# Patient Record
Sex: FEMALE | Race: WHITE | NOT HISPANIC OR LATINO | Employment: FULL TIME | URBAN - METROPOLITAN AREA
[De-identification: names, ages, dates, MRNs, and addresses within clinical notes are randomized per-mention and may not be internally consistent; named-entity substitution may affect disease eponyms.]

---

## 2020-01-13 ENCOUNTER — OFFICE VISIT (OUTPATIENT)
Dept: FAMILY MEDICINE CLINIC | Facility: CLINIC | Age: 25
End: 2020-01-13

## 2020-01-13 VITALS
HEIGHT: 68 IN | SYSTOLIC BLOOD PRESSURE: 124 MMHG | HEART RATE: 85 BPM | TEMPERATURE: 97.9 F | RESPIRATION RATE: 16 BRPM | BODY MASS INDEX: 25.76 KG/M2 | WEIGHT: 170 LBS | DIASTOLIC BLOOD PRESSURE: 74 MMHG | OXYGEN SATURATION: 99 %

## 2020-01-13 DIAGNOSIS — R10.11 RIGHT UPPER QUADRANT ABDOMINAL PAIN: Primary | ICD-10-CM

## 2020-01-13 DIAGNOSIS — R11.2 NAUSEA AND VOMITING, INTRACTABILITY OF VOMITING NOT SPECIFIED, UNSPECIFIED VOMITING TYPE: ICD-10-CM

## 2020-01-13 DIAGNOSIS — E87.6 HYPOKALEMIA: ICD-10-CM

## 2020-01-13 PROCEDURE — 99203 OFFICE O/P NEW LOW 30 MIN: CPT | Performed by: NURSE PRACTITIONER

## 2020-01-13 RX ORDER — ONDANSETRON 4 MG/1
4 TABLET, FILM COATED ORAL EVERY 8 HOURS PRN
COMMUNITY
End: 2020-07-22

## 2020-01-13 NOTE — PROGRESS NOTES
Subjective   Yoly Reed is a 24 y.o. female.       History of Present Illness Here to establish care. PCP is Sophia Marshall MD in [place] who is out for the flu.  Patient was seen in [place] ER last night for nausea, vomiting and abd apin.  3-4 days of abd pain. Last night was awoken from her sleep with severe RUQ abd pain with nausea and vomiting. Pain radiated to upper abd. Pain was 10/10. Currently 4/10. Vomited 12-13 times. No diarrhea. She ate sausage biscuit, cajun chicken pasta yesterday.  3 years ago had HIDA scan for nausea after high fat meals. HIDA was 47%. US was normal.  She took Zofran at the hospital. She ate a pop-tart at 7:15 this am. Passing gas  Some nausea.  4 months post partum.    Outpatient Encounter Medications as of 1/13/2020   Medication Sig Dispense Refill   • ondansetron (ZOFRAN) 4 MG tablet Take 4 mg by mouth Every 8 (Eight) Hours As Needed for Nausea or Vomiting.     • SPRINTEC 28 0.25-35 MG-MCG per tablet Take 1 tablet by mouth Daily. as directed       No facility-administered encounter medications on file as of 1/13/2020.        The following portions of the patient's history were reviewed and updated as appropriate: allergies, current medications, past family history, past medical history, past social history, past surgical history and problem list.    Review of Systems   Constitutional: Negative for appetite change, fever, unexpected weight gain and unexpected weight loss.   HENT: Negative for congestion, nosebleeds, sore throat and trouble swallowing.    Eyes: Negative for visual disturbance.   Respiratory: Negative for cough, shortness of breath and wheezing.    Cardiovascular: Negative for chest pain, palpitations and leg swelling.   Gastrointestinal: Positive for abdominal pain, nausea and vomiting. Negative for blood in stool, constipation and diarrhea.   Endocrine: Negative for polydipsia, polyphagia and polyuria.   Genitourinary: Negative for dysuria, frequency and  "hematuria.   Musculoskeletal: Negative for arthralgias, joint swelling and myalgias.   Skin: Negative for rash.   Neurological: Negative for dizziness, seizures, syncope and numbness.   Hematological: Negative for adenopathy. Does not bruise/bleed easily.   Psychiatric/Behavioral: Negative for behavioral problems, sleep disturbance and depressed mood. The patient is not nervous/anxious.        Objective     Visit Vitals  /74   Pulse 85   Temp 97.9 °F (36.6 °C)   Resp 16   Ht 172.7 cm (68\")   Wt 77.1 kg (170 lb)   LMP 01/07/2020   SpO2 99%   BMI 25.85 kg/m²       Physical Exam   Constitutional: She is oriented to person, place, and time. She appears well-developed and well-nourished. No distress.   HENT:   Head: Normocephalic and atraumatic.   Right Ear: Tympanic membrane and external ear normal.   Left Ear: Tympanic membrane and external ear normal.   Nose: Nose normal.   Mouth/Throat: Oropharynx is clear and moist. No oropharyngeal exudate.   Eyes: Pupils are equal, round, and reactive to light. Conjunctivae are normal. Right eye exhibits no discharge. Left eye exhibits no discharge. No scleral icterus.   Neck: Neck supple. No tracheal deviation present. No thyromegaly present.   Cardiovascular: Normal rate, regular rhythm and normal heart sounds. Exam reveals no gallop and no friction rub.   No murmur heard.  Pulmonary/Chest: Effort normal and breath sounds normal. No respiratory distress. She has no wheezes.   Abdominal: Soft. She exhibits no distension and no mass. Bowel sounds are decreased. There is tenderness in the right upper quadrant, epigastric area and left upper quadrant.   Musculoskeletal: She exhibits no edema or deformity.   Lymphadenopathy:     She has no cervical adenopathy.   Neurological: She is alert and oriented to person, place, and time. Coordination normal.   Skin: Skin is warm and dry. Capillary refill takes less than 2 seconds. No rash noted. No erythema.   Psychiatric: She has a " normal mood and affect. Her speech is normal and behavior is normal. Judgment and thought content normal.   Nursing note and vitals reviewed.        Assessment/Plan   Yoly was seen today for vomiting and abdominal pain.    Diagnoses and all orders for this visit:    Right upper quadrant abdominal pain  -     US Abdomen Complete; Future  -     Ambulatory Referral to General Surgery    Nausea and vomiting, intractability of vomiting not specified, unspecified vomiting type  -     US Abdomen Complete; Future  -     Ambulatory Referral to General Surgery    Hypokalemia      ER notes reviewed. CT showed diffuse gallbladder wall thickening and hypedense punctate lesion. US recommended  Potassium 3.1.  Drink gatorade today. Low fat diet.  Use Zofran prn.  Off work today.  Follow up after visit with surgeon.   Return to ER for worsening symptoms.

## 2020-01-16 ENCOUNTER — HOSPITAL ENCOUNTER (OUTPATIENT)
Dept: ULTRASOUND IMAGING | Facility: HOSPITAL | Age: 25
End: 2020-01-16

## 2020-03-02 ENCOUNTER — OFFICE VISIT (OUTPATIENT)
Dept: FAMILY MEDICINE CLINIC | Facility: CLINIC | Age: 25
End: 2020-03-02

## 2020-03-02 VITALS
TEMPERATURE: 98 F | BODY MASS INDEX: 25.57 KG/M2 | HEART RATE: 77 BPM | HEIGHT: 68 IN | WEIGHT: 168.7 LBS | RESPIRATION RATE: 19 BRPM | OXYGEN SATURATION: 100 %

## 2020-03-02 DIAGNOSIS — R11.2 NAUSEA AND VOMITING, INTRACTABILITY OF VOMITING NOT SPECIFIED, UNSPECIFIED VOMITING TYPE: ICD-10-CM

## 2020-03-02 DIAGNOSIS — R10.12 LEFT UPPER QUADRANT PAIN: ICD-10-CM

## 2020-03-02 DIAGNOSIS — R42 DIZZINESS: ICD-10-CM

## 2020-03-02 DIAGNOSIS — R19.7 DIARRHEA, UNSPECIFIED TYPE: Primary | ICD-10-CM

## 2020-03-02 LAB
ALBUMIN SERPL-MCNC: 4.8 G/DL (ref 3.5–5.2)
ALBUMIN/GLOB SERPL: 1.9 G/DL
ALP SERPL-CCNC: 76 U/L (ref 39–117)
ALT SERPL W P-5'-P-CCNC: 23 U/L (ref 1–33)
AMYLASE SERPL-CCNC: 28 U/L (ref 28–100)
ANION GAP SERPL CALCULATED.3IONS-SCNC: 18.3 MMOL/L (ref 5–15)
AST SERPL-CCNC: 24 U/L (ref 1–32)
BASOPHILS # BLD AUTO: 0.04 10*3/MM3 (ref 0–0.2)
BASOPHILS NFR BLD AUTO: 0.6 % (ref 0–1.5)
BILIRUB SERPL-MCNC: 0.2 MG/DL (ref 0.2–1.2)
BUN BLD-MCNC: 9 MG/DL (ref 6–20)
BUN/CREAT SERPL: 9.4 (ref 7–25)
CALCIUM SPEC-SCNC: 9.3 MG/DL (ref 8.6–10.5)
CHLORIDE SERPL-SCNC: 100 MMOL/L (ref 98–107)
CO2 SERPL-SCNC: 21.7 MMOL/L (ref 22–29)
CREAT BLD-MCNC: 0.96 MG/DL (ref 0.57–1)
DEPRECATED RDW RBC AUTO: 40.3 FL (ref 37–54)
EOSINOPHIL # BLD AUTO: 0.06 10*3/MM3 (ref 0–0.4)
EOSINOPHIL NFR BLD AUTO: 1 % (ref 0.3–6.2)
ERYTHROCYTE [DISTWIDTH] IN BLOOD BY AUTOMATED COUNT: 13.5 % (ref 12.3–15.4)
GFR SERPL CREATININE-BSD FRML MDRD: 72 ML/MIN/1.73
GLOBULIN UR ELPH-MCNC: 2.5 GM/DL
GLUCOSE BLD-MCNC: 85 MG/DL (ref 65–99)
HAV IGM SERPL QL IA: NORMAL
HCT VFR BLD AUTO: 37.9 % (ref 34–46.6)
HGB BLD-MCNC: 12.4 G/DL (ref 12–15.9)
IMM GRANULOCYTES # BLD AUTO: 0.02 10*3/MM3 (ref 0–0.05)
IMM GRANULOCYTES NFR BLD AUTO: 0.3 % (ref 0–0.5)
LIPASE SERPL-CCNC: 18 U/L (ref 13–60)
LYMPHOCYTES # BLD AUTO: 1.96 10*3/MM3 (ref 0.7–3.1)
LYMPHOCYTES NFR BLD AUTO: 31.4 % (ref 19.6–45.3)
MCH RBC QN AUTO: 27.3 PG (ref 26.6–33)
MCHC RBC AUTO-ENTMCNC: 32.7 G/DL (ref 31.5–35.7)
MCV RBC AUTO: 83.3 FL (ref 79–97)
MONOCYTES # BLD AUTO: 0.39 10*3/MM3 (ref 0.1–0.9)
MONOCYTES NFR BLD AUTO: 6.3 % (ref 5–12)
NEUTROPHILS # BLD AUTO: 3.77 10*3/MM3 (ref 1.7–7)
NEUTROPHILS NFR BLD AUTO: 60.4 % (ref 42.7–76)
NRBC BLD AUTO-RTO: 0 /100 WBC (ref 0–0.2)
PLATELET # BLD AUTO: 353 10*3/MM3 (ref 140–450)
PMV BLD AUTO: 11.6 FL (ref 6–12)
POTASSIUM BLD-SCNC: 4.2 MMOL/L (ref 3.5–5.2)
PROT SERPL-MCNC: 7.3 G/DL (ref 6–8.5)
RBC # BLD AUTO: 4.55 10*6/MM3 (ref 3.77–5.28)
SODIUM BLD-SCNC: 140 MMOL/L (ref 136–145)
T4 FREE SERPL-MCNC: 1.19 NG/DL (ref 0.93–1.7)
TSH SERPL DL<=0.05 MIU/L-ACNC: 0.56 UIU/ML (ref 0.27–4.2)
VIT B12 BLD-MCNC: 326 PG/ML (ref 211–946)
WBC NRBC COR # BLD: 6.24 10*3/MM3 (ref 3.4–10.8)

## 2020-03-02 PROCEDURE — 85025 COMPLETE CBC W/AUTO DIFF WBC: CPT | Performed by: NURSE PRACTITIONER

## 2020-03-02 PROCEDURE — 83516 IMMUNOASSAY NONANTIBODY: CPT | Performed by: NURSE PRACTITIONER

## 2020-03-02 PROCEDURE — 82784 ASSAY IGA/IGD/IGG/IGM EACH: CPT | Performed by: NURSE PRACTITIONER

## 2020-03-02 PROCEDURE — 82607 VITAMIN B-12: CPT | Performed by: NURSE PRACTITIONER

## 2020-03-02 PROCEDURE — 83690 ASSAY OF LIPASE: CPT | Performed by: NURSE PRACTITIONER

## 2020-03-02 PROCEDURE — 86709 HEPATITIS A IGM ANTIBODY: CPT | Performed by: NURSE PRACTITIONER

## 2020-03-02 PROCEDURE — 82150 ASSAY OF AMYLASE: CPT | Performed by: NURSE PRACTITIONER

## 2020-03-02 PROCEDURE — 80053 COMPREHEN METABOLIC PANEL: CPT | Performed by: NURSE PRACTITIONER

## 2020-03-02 PROCEDURE — 99214 OFFICE O/P EST MOD 30 MIN: CPT | Performed by: NURSE PRACTITIONER

## 2020-03-02 PROCEDURE — 84443 ASSAY THYROID STIM HORMONE: CPT | Performed by: NURSE PRACTITIONER

## 2020-03-02 PROCEDURE — 86255 FLUORESCENT ANTIBODY SCREEN: CPT | Performed by: NURSE PRACTITIONER

## 2020-03-02 PROCEDURE — 84439 ASSAY OF FREE THYROXINE: CPT | Performed by: NURSE PRACTITIONER

## 2020-03-02 RX ORDER — ONDANSETRON 8 MG/1
8 TABLET, ORALLY DISINTEGRATING ORAL EVERY 8 HOURS PRN
Qty: 30 TABLET | Refills: 1 | Status: SHIPPED | OUTPATIENT
Start: 2020-03-02 | End: 2020-07-22

## 2020-03-02 NOTE — PROGRESS NOTES
Subjective   Yoly Cam is a 24 y.o. female.     History of Present Illness Complains of episodes of alternating constipation and diarrhea for years. Justin on 1/22/20. Since then she has had frequent nausea, vomiting, diarrhea, dizziness and LLQ pain radiating to umbilicus. LMP 2/11/20. Cycles are reg. Not taking birth control. Last birth control 3 weeks ago. She has taking zofran and ibuprofen.  Will have diarrhea 5-6 times a day. No melena or hematochezia.  Last CT was prior to justin. No Gi? findings except the gall stones.  Mom and sister with same symptoms that are chronic and not diagnosed.      Outpatient Encounter Medications as of 3/2/2020   Medication Sig Dispense Refill   • ondansetron (ZOFRAN) 4 MG tablet Take 4 mg by mouth Every 8 (Eight) Hours As Needed for Nausea or Vomiting.     • SPRINTEC 28 0.25-35 MG-MCG per tablet Take 1 tablet by mouth Daily. as directed       No facility-administered encounter medications on file as of 3/2/2020.        The following portions of the patient's history were reviewed and updated as appropriate: allergies, current medications, past family history, past medical history, past social history, past surgical history and problem list.    Review of Systems   Constitutional: Positive for fatigue. Negative for appetite change, fever, unexpected weight gain and unexpected weight loss.   HENT: Negative for congestion, nosebleeds, sore throat and trouble swallowing.    Eyes: Negative for visual disturbance.   Respiratory: Negative for cough, shortness of breath and wheezing.    Cardiovascular: Negative for chest pain, palpitations and leg swelling.   Gastrointestinal: Positive for abdominal pain, diarrhea, nausea and vomiting. Negative for blood in stool and constipation.   Endocrine: Negative for polydipsia, polyphagia and polyuria.   Genitourinary: Negative for dysuria, frequency and hematuria.   Musculoskeletal: Negative for arthralgias, joint swelling and  "myalgias.   Skin: Negative for rash.   Neurological: Positive for dizziness and headache. Negative for seizures, syncope and numbness.   Hematological: Negative for adenopathy. Does not bruise/bleed easily.   Psychiatric/Behavioral: Negative for behavioral problems, sleep disturbance and depressed mood. The patient is not nervous/anxious.        Objective     Visit Vitals  Pulse 77   Temp 98 °F (36.7 °C)   Resp 19   Ht 172.7 cm (68\")   Wt 76.5 kg (168 lb 11.2 oz)   SpO2 100%   BMI 25.65 kg/m²       Physical Exam   Constitutional: She is oriented to person, place, and time. She appears well-developed and well-nourished. No distress.   HENT:   Head: Normocephalic and atraumatic.   Right Ear: External ear normal.   Left Ear: External ear normal.   Nose: Nose normal.   Eyes: Conjunctivae are normal. Right eye exhibits no discharge. Left eye exhibits no discharge. No scleral icterus.   Neck: Normal range of motion.   Cardiovascular: Normal rate.   Pulmonary/Chest: Effort normal. No respiratory distress.   Abdominal: Soft. Bowel sounds are normal. She exhibits no distension and no mass. There is tenderness. There is no rebound and no guarding. No hernia.   Pain in LUQ. Well healed surgical scars.   Musculoskeletal: She exhibits no deformity.   Neurological: She is alert and oriented to person, place, and time. Coordination normal.   Skin: Skin is warm and dry.   Psychiatric: She has a normal mood and affect. Her behavior is normal. Judgment and thought content normal.   Vitals reviewed.        Assessment/Plan   Yoly was seen today for nausea, vomiting and diarrhea.    Diagnoses and all orders for this visit:    Diarrhea, unspecified type  -     Clostridium Difficile Toxin - Stool, Per Rectum; Future  -     Ova & Parasite Examination - Stool, Per Rectum; Future  -     Stool Culture (Reference Lab) - Stool, Per Rectum; Future    Nausea and vomiting, intractability of vomiting not specified, unspecified vomiting type  -   "   CBC & Differential  -     Comprehensive Metabolic Panel  -     Hepatitis A Antibody, IgM  -     Amylase  -     Lipase  -     Celiac Comprehensive Panel  -     CT Abdomen Pelvis Without Contrast; Future  -     T4, Free  -     TSH  -     Vitamin B12  -     ondansetron ODT (ZOFRAN-ODT) 8 MG disintegrating tablet; Take 1 tablet by mouth Every 8 (Eight) Hours As Needed for Nausea or Vomiting.  -     CBC Auto Differential    Dizziness    Left upper quadrant pain  -     CBC & Differential  -     Comprehensive Metabolic Panel  -     Hepatitis A Antibody, IgM  -     Amylase  -     Lipase  -     Celiac Comprehensive Panel  -     CT Abdomen Pelvis Without Contrast; Future  -     T4, Free  -     TSH  -     Vitamin B12  -     CBC Auto Differential    Check labs and CT. If both negative, will refer to GI for eval.  Discussed the nature of the disease including, risks, complications, implications, management.

## 2020-03-04 LAB
ENDOMYSIUM IGA SER QL: NEGATIVE
GLIADIN PEPTIDE IGA SER-ACNC: 5 UNITS (ref 0–19)
GLIADIN PEPTIDE IGG SER-ACNC: 3 UNITS (ref 0–19)
IGA SERPL-MCNC: 172 MG/DL (ref 87–352)
TTG IGA SER-ACNC: 2 U/ML (ref 0–3)
TTG IGG SER-ACNC: 4 U/ML (ref 0–5)

## 2020-03-05 ENCOUNTER — TELEPHONE (OUTPATIENT)
Dept: FAMILY MEDICINE CLINIC | Facility: CLINIC | Age: 25
End: 2020-03-05

## 2020-03-05 DIAGNOSIS — R11.2 NAUSEA AND VOMITING, INTRACTABILITY OF VOMITING NOT SPECIFIED, UNSPECIFIED VOMITING TYPE: ICD-10-CM

## 2020-03-05 DIAGNOSIS — R10.12 LEFT UPPER QUADRANT PAIN: ICD-10-CM

## 2020-03-05 DIAGNOSIS — R19.7 DIARRHEA, UNSPECIFIED TYPE: Primary | ICD-10-CM

## 2020-03-05 NOTE — TELEPHONE ENCOUNTER
Discussed labs with patient. All normal. Has not had CT. Has not brought stools in. States currently constipated.  Wants referral to GI. I suspect IBS. Symptoms are interfering with work and home. Will refer.

## 2020-03-06 LAB — C DIFF TOX GENS STL QL NAA+PROBE: DETECTED

## 2020-03-06 PROCEDURE — 87427 SHIGA-LIKE TOXIN AG IA: CPT | Performed by: NURSE PRACTITIONER

## 2020-03-06 PROCEDURE — 87493 C DIFF AMPLIFIED PROBE: CPT | Performed by: NURSE PRACTITIONER

## 2020-03-06 PROCEDURE — 87045 FECES CULTURE AEROBIC BACT: CPT | Performed by: NURSE PRACTITIONER

## 2020-03-06 PROCEDURE — 87046 STOOL CULTR AEROBIC BACT EA: CPT | Performed by: NURSE PRACTITIONER

## 2020-03-06 PROCEDURE — 87209 SMEAR COMPLEX STAIN: CPT | Performed by: NURSE PRACTITIONER

## 2020-03-06 PROCEDURE — 87177 OVA AND PARASITES SMEARS: CPT | Performed by: NURSE PRACTITIONER

## 2020-03-10 LAB
CAMPYLOBACTER STL CULT: NORMAL
E COLI SXT STL QL IA: NEGATIVE
Lab: NORMAL
Lab: NORMAL
SALM + SHIG STL CULT: NORMAL

## 2020-03-12 ENCOUNTER — APPOINTMENT (OUTPATIENT)
Dept: CT IMAGING | Facility: HOSPITAL | Age: 25
End: 2020-03-12

## 2020-03-16 LAB
O+P SPEC MICRO: NORMAL
O+P STL TRI STN: NORMAL

## 2020-04-13 ENCOUNTER — TELEMEDICINE (OUTPATIENT)
Dept: GASTROENTEROLOGY | Facility: CLINIC | Age: 25
End: 2020-04-13

## 2020-04-13 DIAGNOSIS — K59.00 CONSTIPATION, UNSPECIFIED CONSTIPATION TYPE: Primary | ICD-10-CM

## 2020-04-13 DIAGNOSIS — R10.32 LEFT LOWER QUADRANT ABDOMINAL PAIN: ICD-10-CM

## 2020-04-13 DIAGNOSIS — R19.7 DIARRHEA, UNSPECIFIED TYPE: ICD-10-CM

## 2020-04-13 DIAGNOSIS — R11.0 NAUSEA: ICD-10-CM

## 2020-04-13 DIAGNOSIS — K58.2 IRRITABLE BOWEL SYNDROME WITH BOTH CONSTIPATION AND DIARRHEA: ICD-10-CM

## 2020-04-13 PROCEDURE — 99244 OFF/OP CNSLTJ NEW/EST MOD 40: CPT | Performed by: INTERNAL MEDICINE

## 2020-04-13 NOTE — PROGRESS NOTES
PCP: Brent Rodriguez, DNP, APRN    No chief complaint on file.  Abdominal pain and altered bowel habits.    History of Present Illness:   HPI   This was a video visit.  I appreciate the consult for abdominal pain and periods of altered bowel habits.  Ms. Nisha Cuevas  is a 24-year-old with a history of altered bowel habits for many years.  She actually can date back bowel habit irregularity to her high school years and into college.  She primarily has more problems with constipation than diarrhea.  The patient will admit to having or a week at times between bowel movements.  This can be followed by 1 to 2 days of loose stool.  She denies any gross blood in the stool.  Ms. Nisha Cuevas generally has not experienced any nighttime diarrhea unless she had a viral illness.  There is no history of difficult or painful swallowing.  The patient denies any elma heartburn.  She may have some periods of nausea and did have a bout of emesis about 1 month ago.  She admits to having some bloating at times with meals but denies any early fullness.  There is no history of unexplained weight loss.  She denies night sweats, fever or chills.  There is no history of unexplained skin rash or mouth ulcers.  Ms. Nisha Cuevas denies any known family history of Crohn's disease or ulcerative colitis.  There is also no history of other autoimmune disorders.  The patient does not smoke cigarettes or drink alcohol on a regular basis.  She admits to some left lower abdominal pain that is crampy and sometimes sharp in character.  The pain can occur after meals and is sometimes followed by a sense of urgency to have a bowel movement.  She admits that after going to the restroom the discomfort proved.  She denies any radiation of the pain to the flanks.  The abdominal pain does not cause her to lose her appetite.  The patient was recently treated for C. difficile with a 14-day course of Vancocin.  Ms. Nisha Cuevas also has not experienced any  diarrhea in over 2 weeks.  Past Medical History:   Diagnosis Date   • PCOS (polycystic ovarian syndrome)    • PFO (patent foramen ovale)        Past Surgical History:   Procedure Laterality Date   • CHOLECYSTECTOMY     • TONSILLECTOMY     • WISDOM TOOTH EXTRACTION           Current Outpatient Medications:   •  ondansetron (ZOFRAN) 4 MG tablet, Take 4 mg by mouth Every 8 (Eight) Hours As Needed for Nausea or Vomiting., Disp: , Rfl:   •  ondansetron ODT (ZOFRAN-ODT) 8 MG disintegrating tablet, Take 1 tablet by mouth Every 8 (Eight) Hours As Needed for Nausea or Vomiting., Disp: 30 tablet, Rfl: 1  •  SPRINTEC 28 0.25-35 MG-MCG per tablet, Take 1 tablet by mouth Daily. as directed, Disp: , Rfl:     Allergies   Allergen Reactions   • Ceclor [Cefaclor] Hives       Family History   Problem Relation Age of Onset   • Hypertension Mother    • Hypertension Father    • Heart attack Maternal Grandfather    • No Known Problems Sister    • No Known Problems Maternal Grandmother    • Diabetes Paternal Grandmother    • No Known Problems Paternal Grandfather    • No Known Problems Sister        Social History     Socioeconomic History   • Marital status:      Spouse name: Not on file   • Number of children: Not on file   • Years of education: Not on file   • Highest education level: Not on file   Occupational History   • Occupation: CMA   Tobacco Use   • Smoking status: Never Smoker   • Smokeless tobacco: Never Used   Substance and Sexual Activity   • Alcohol use: Yes     Comment: occasion   • Drug use: Never   • Sexual activity: Yes     Birth control/protection: OCP   Social History Narrative    Lives with , daughter and mother-in-law       Review of Systems   Constitutional: Negative for appetite change, fatigue, fever and unexpected weight change.   HENT: Negative for dental problem, mouth sores, postnasal drip, sneezing, trouble swallowing and voice change.    Eyes: Negative for pain, redness and itching.    Respiratory: Negative for cough, shortness of breath and wheezing.    Cardiovascular: Negative for chest pain, palpitations and leg swelling.   Gastrointestinal: Positive for abdominal pain, constipation, diarrhea and nausea. Negative for abdominal distention, anal bleeding, blood in stool, rectal pain and vomiting.   Endocrine: Negative for cold intolerance, heat intolerance, polydipsia and polyuria.   Genitourinary: Negative for dysuria, enuresis, flank pain, hematuria and urgency.   Musculoskeletal: Negative for arthralgias, back pain, joint swelling and myalgias.   Skin: Negative for color change, pallor and rash.   Allergic/Immunologic: Negative for environmental allergies, food allergies and immunocompromised state.   Neurological: Negative for dizziness, tremors, seizures, facial asymmetry, numbness and headaches.   Psychiatric/Behavioral: Negative for behavioral problems, dysphoric mood, hallucinations and self-injury.       There were no vitals filed for this visit.    Physical Exam    Diagnoses and all orders for this visit:    Constipation, unspecified constipation type    Diarrhea, unspecified type    Irritable bowel syndrome with both constipation and diarrhea    Nausea    Left lower quadrant abdominal pain    The patient has a clinical history that is consistent with Gigi criteria for irritable bowel syndrome.  There is a slight predominance of constipation but there have been some periods in the past of diarrhea.  There are no concerning signs of unexplained weight loss or anemia.  However, there is a need to assess for any evidence of Crohn's disease in this clinical setting.  Also will consider upper tract mucosal pathology including celiac sprue, eosinophilic gastropathy and lymphocytic gastropathy.  Patient is status post cholecystectomy and may have some concomitant type III bile acid diarrhea.      Plan: Will proceed with an EGD and colonoscopy.  The procedure was explained to the patient and  she agrees to proceed.  Will plan on performing the examinations this month given the chronicity and lifestyle interruption.

## 2020-04-16 RX ORDER — SODIUM, POTASSIUM,MAG SULFATES 17.5-3.13G
1 SOLUTION, RECONSTITUTED, ORAL ORAL TAKE AS DIRECTED
Qty: 2 BOTTLE | Refills: 0 | Status: SHIPPED | OUTPATIENT
Start: 2020-04-16 | End: 2020-07-22

## 2020-04-27 ENCOUNTER — TELEPHONE (OUTPATIENT)
Dept: GASTROENTEROLOGY | Facility: CLINIC | Age: 25
End: 2020-04-27

## 2020-06-30 DIAGNOSIS — R42 DIZZINESS: Primary | ICD-10-CM

## 2020-06-30 DIAGNOSIS — R07.89 CHEST PRESSURE: ICD-10-CM

## 2020-06-30 PROCEDURE — 93000 ELECTROCARDIOGRAM COMPLETE: CPT | Performed by: FAMILY MEDICINE

## 2020-07-22 ENCOUNTER — OFFICE VISIT (OUTPATIENT)
Dept: FAMILY MEDICINE CLINIC | Facility: CLINIC | Age: 25
End: 2020-07-22

## 2020-07-22 VITALS
DIASTOLIC BLOOD PRESSURE: 68 MMHG | OXYGEN SATURATION: 98 % | SYSTOLIC BLOOD PRESSURE: 108 MMHG | RESPIRATION RATE: 18 BRPM | TEMPERATURE: 97.1 F | WEIGHT: 170 LBS | BODY MASS INDEX: 25.76 KG/M2 | HEART RATE: 84 BPM | HEIGHT: 68 IN

## 2020-07-22 DIAGNOSIS — N30.01 ACUTE CYSTITIS WITH HEMATURIA: ICD-10-CM

## 2020-07-22 DIAGNOSIS — R39.9 UTI SYMPTOMS: Primary | ICD-10-CM

## 2020-07-22 LAB
BILIRUB BLD-MCNC: NEGATIVE MG/DL
CLARITY, POC: CLEAR
COLOR UR: YELLOW
GLUCOSE UR STRIP-MCNC: NEGATIVE MG/DL
KETONES UR QL: NEGATIVE
LEUKOCYTE EST, POC: ABNORMAL
NITRITE UR-MCNC: POSITIVE MG/ML
PH UR: 5.5 [PH] (ref 5–8)
PROT UR STRIP-MCNC: NEGATIVE MG/DL
RBC # UR STRIP: ABNORMAL /UL
SP GR UR: 1.02 (ref 1–1.03)
UROBILINOGEN UR QL: NORMAL

## 2020-07-22 PROCEDURE — 87088 URINE BACTERIA CULTURE: CPT | Performed by: NURSE PRACTITIONER

## 2020-07-22 PROCEDURE — 81003 URINALYSIS AUTO W/O SCOPE: CPT | Performed by: NURSE PRACTITIONER

## 2020-07-22 PROCEDURE — 87086 URINE CULTURE/COLONY COUNT: CPT | Performed by: NURSE PRACTITIONER

## 2020-07-22 PROCEDURE — 99213 OFFICE O/P EST LOW 20 MIN: CPT | Performed by: NURSE PRACTITIONER

## 2020-07-22 PROCEDURE — 87186 SC STD MICRODIL/AGAR DIL: CPT | Performed by: NURSE PRACTITIONER

## 2020-07-22 RX ORDER — PHENAZOPYRIDINE HYDROCHLORIDE 200 MG/1
200 TABLET, FILM COATED ORAL 3 TIMES DAILY PRN
Qty: 6 TABLET | Refills: 0 | Status: SHIPPED | OUTPATIENT
Start: 2020-07-22 | End: 2020-07-24

## 2020-07-22 RX ORDER — FLUCONAZOLE 150 MG/1
150 TABLET ORAL
Qty: 2 TABLET | Refills: 0 | Status: SHIPPED | OUTPATIENT
Start: 2020-07-22 | End: 2020-07-26

## 2020-07-22 RX ORDER — SULFAMETHOXAZOLE AND TRIMETHOPRIM 800; 160 MG/1; MG/1
1 TABLET ORAL 2 TIMES DAILY
Qty: 6 TABLET | Refills: 0 | Status: SHIPPED | OUTPATIENT
Start: 2020-07-22 | End: 2020-07-25

## 2020-07-22 NOTE — PROGRESS NOTES
Subjective   Yoly Cam is a 24 y.o. female.   Chief Complaint   Patient presents with   • Same Day     uti       Urinary Tract Infection    This is a new problem. The current episode started yesterday. The quality of the pain is described as aching and burning. The pain is at a severity of 4/10. The pain is mild. There has been no fever. She is sexually active. There is no history of pyelonephritis. Associated symptoms include frequency. Pertinent negatives include no chills, discharge, flank pain, hematuria, hesitancy, nausea, possible pregnancy, sweats, urgency or vomiting. Associated symptoms comments: Urgency and odor. She has tried increased fluids for the symptoms. The treatment provided no relief. Her past medical history is significant for recurrent UTIs. There is no history of catheterization, kidney stones, a single kidney, urinary stasis or a urological procedure.        The following portions of the patient's history were reviewed and updated as appropriate: allergies, current medications, past family history, past medical history, past social history, past surgical history and problem list.    Review of Systems   Constitutional: Negative for chills.   HENT: Negative.    Respiratory: Negative.    Cardiovascular: Negative.    Gastrointestinal: Negative.  Negative for nausea and vomiting.   Genitourinary: Positive for dysuria and frequency. Negative for flank pain, hematuria, hesitancy, urgency and vaginal discharge.        Odor to urine    Musculoskeletal: Negative.    Skin: Negative.    Neurological: Negative.    Hematological: Negative.    Psychiatric/Behavioral: Negative.      Past Surgical History:   Procedure Laterality Date   • CHOLECYSTECTOMY     • TONSILLECTOMY     • WISDOM TOOTH EXTRACTION       Past Medical History:   Diagnosis Date   • PCOS (polycystic ovarian syndrome)    • PFO (patent foramen ovale)        Objective   Allergies   Allergen Reactions   • Ceclor [Cefaclor] Hives  "    Visit Vitals  /68 (BP Location: Left arm, Patient Position: Sitting, Cuff Size: Adult)   Pulse 84   Temp 97.1 °F (36.2 °C) (Temporal)   Resp 18   Ht 172.7 cm (68\")   Wt 77.1 kg (170 lb)   SpO2 98%   BMI 25.85 kg/m²       Physical Exam   Constitutional: She is oriented to person, place, and time. Vital signs are normal. She appears well-developed and well-nourished. She is cooperative. She does not appear ill.   HENT:   Head: Normocephalic.   Eyes: Pupils are equal, round, and reactive to light.   Neck: Normal range of motion.   Cardiovascular: Normal rate and regular rhythm.   Pulmonary/Chest: Effort normal and breath sounds normal.   Abdominal: Soft.   Neurological: She is alert and oriented to person, place, and time.   Skin: Skin is warm, dry and intact. Capillary refill takes less than 2 seconds.   Psychiatric: She has a normal mood and affect. Her behavior is normal. Judgment and thought content normal. Cognition and memory are normal.   Vitals reviewed.      Assessment/Plan   Yoly was seen today for same day.    Diagnoses and all orders for this visit:    UTI symptoms  -     POCT urinalysis dipstick, automated    Acute cystitis with hematuria  -     Urine Culture - Urine, Urine, Clean Catch  -     fluconazole (Diflucan) 150 MG tablet; Take 1 tablet by mouth Every 3 (Three) Days for 2 doses.  -     phenazopyridine (PYRIDIUM) 200 MG tablet; Take 1 tablet by mouth 3 (Three) Times a Day As Needed for Bladder Spasms for up to 2 days.  -     sulfamethoxazole-trimethoprim (BACTRIM DS,SEPTRA DS) 800-160 MG per tablet; Take 1 tablet by mouth 2 (Two) Times a Day for 3 days.        Will treat with bactrim -\" macrobid doesn't seem to work\".   cipro has black box warning wants to try the bactrim   Will send for culture.   pyridum for comfort.   See uti instructions. '  Increase fluid.   Will order diflucan for possible antibiotic induced yeast due to recent antibiotic use.   Follow up with PCP as needed.      "     Patient Instructions   Urinary Tract Infection, Adult  A urinary tract infection (UTI) is an infection of any part of the urinary tract. The urinary tract includes:  · The kidneys.  · The ureters.  · The bladder.  · The urethra.  These organs make, store, and get rid of pee (urine) in the body.  What are the causes?  This is caused by germs (bacteria) in your genital area. These germs grow and cause swelling (inflammation) of your urinary tract.  What increases the risk?  You are more likely to develop this condition if:  · You have a small, thin tube (catheter) to drain pee.  · You cannot control when you pee or poop (incontinence).  · You are female, and:  ? You use these methods to prevent pregnancy:  ? A medicine that kills sperm (spermicide).  ? A device that blocks sperm (diaphragm).  ? You have low levels of a female hormone (estrogen).  ? You are pregnant.  · You have genes that add to your risk.  · You are sexually active.  · You take antibiotic medicines.  · You have trouble peeing because of:  ? A prostate that is bigger than normal, if you are male.  ? A blockage in the part of your body that drains pee from the bladder (urethra).  ? A kidney stone.  ? A nerve condition that affects your bladder (neurogenic bladder).  ? Not getting enough to drink.  ? Not peeing often enough.  · You have other conditions, such as:  ? Diabetes.  ? A weak disease-fighting system (immune system).  ? Sickle cell disease.  ? Gout.  ? Injury of the spine.  What are the signs or symptoms?  Symptoms of this condition include:  · Needing to pee right away (urgently).  · Peeing often.  · Peeing small amounts often.  · Pain or burning when peeing.  · Blood in the pee.  · Pee that smells bad or not like normal.  · Trouble peeing.  · Pee that is cloudy.  · Fluid coming from the vagina, if you are female.  · Pain in the belly or lower back.  Other symptoms include:  · Throwing up (vomiting).  · No urge to eat.  · Feeling mixed up  (confused).  · Being tired and grouchy (irritable).  · A fever.  · Watery poop (diarrhea).  How is this treated?  This condition may be treated with:  · Antibiotic medicine.  · Other medicines.  · Drinking enough water.  Follow these instructions at home:    Medicines  · Take over-the-counter and prescription medicines only as told by your doctor.  · If you were prescribed an antibiotic medicine, take it as told by your doctor. Do not stop taking it even if you start to feel better.  General instructions  · Make sure you:  ? Pee until your bladder is empty.  ? Do not hold pee for a long time.  ? Empty your bladder after sex.  ? Wipe from front to back after pooping if you are a female. Use each tissue one time when you wipe.  · Drink enough fluid to keep your pee pale yellow.  · Keep all follow-up visits as told by your doctor. This is important.  Contact a doctor if:  · You do not get better after 1-2 days.  · Your symptoms go away and then come back.  Get help right away if:  · You have very bad back pain.  · You have very bad pain in your lower belly.  · You have a fever.  · You are sick to your stomach (nauseous).  · You are throwing up.  Summary  · A urinary tract infection (UTI) is an infection of any part of the urinary tract.  · This condition is caused by germs in your genital area.  · There are many risk factors for a UTI. These include having a small, thin tube to drain pee and not being able to control when you pee or poop.  · Treatment includes antibiotic medicines for germs.  · Drink enough fluid to keep your pee pale yellow.  This information is not intended to replace advice given to you by your health care provider. Make sure you discuss any questions you have with your health care provider.  Document Released: 06/05/2009 Document Revised: 12/05/2019 Document Reviewed: 06/27/2019  [employer] Patient Education © 2020 [employer]        MILA Jennings

## 2020-07-24 LAB — BACTERIA SPEC AEROBE CULT: ABNORMAL

## 2020-09-17 ENCOUNTER — OFFICE VISIT (OUTPATIENT)
Dept: FAMILY MEDICINE CLINIC | Facility: CLINIC | Age: 25
End: 2020-09-17

## 2020-09-17 VITALS — DIASTOLIC BLOOD PRESSURE: 68 MMHG | SYSTOLIC BLOOD PRESSURE: 106 MMHG

## 2020-09-17 DIAGNOSIS — D22.5 ATYPICAL NEVUS OF THORACIC REGION: Primary | ICD-10-CM

## 2020-09-17 PROCEDURE — 99213 OFFICE O/P EST LOW 20 MIN: CPT | Performed by: FAMILY MEDICINE

## 2020-09-17 NOTE — PATIENT INSTRUCTIONS
Sutured Wound Care  Sutures are stitches that can be used to close wounds. Taking care of your wound properly can help to prevent pain and infection. It can also help your wound to heal more quickly. Follow instructions from your health care provider about how to care for your sutured wound.  Supplies needed:  · Soap and water.  · A clean bandage (dressing), if needed.  · Antibiotic ointment.  · A clean towel.  How to care for your sutured wound    · Keep the wound completely dry for the first 24 hours, or for as long as directed by your health care provider. After 24-48 hours, you may shower or bathe as directed by your health care provider. Do not soak or submerge the wound in water until the sutures have been removed.  · After the first 24 hours, clean the wound once a day, or as often as directed by your health care provider, using the following steps:  ? Wash the wound with soap and water.  ? Rinse the wound with water to remove all soap.  ? Pat the wound dry with a clean towel. Do not rub the wound.  · After cleaning the wound, apply a thin layer of antibiotic ointment as directed by your health care provider. This will prevent infection and keep the dressing from sticking to the wound.  · Follow instructions from your health care provider about how to change your dressing:  ? Wash your hands with soap and water. If soap and water are not available, use hand .  ? Change your dressing at least once a day, or as often as told by your health care provider. If your dressing gets wet or dirty, change it.  ? Leave sutures and other skin closures, such as adhesive tape or skin glue, in place. These skin closures may need to stay in place for 2 weeks or longer. If adhesive strip edges start to loosen and curl up, you may trim the loose edges. Do not remove adhesive strips completely unless your health care provider tells you to do that.  · Check your wound every day for signs of infection. Watch  for:  ? Redness, swelling, or pain.  ? Fluid or blood.  ? Warmth.  ? Pus or a bad smell.  · Have the sutures removed as directed by your health care provider.  Follow these instructions at home:  Medicines  · Take or apply over-the-counter and prescription medicines only as told by your health care provider.  · If you were prescribed an antibiotic medicine or ointment, take or apply it as told by your health care provider. Do not stop using the antibiotic even if your condition improves.  General instructions  · To help reduce scarring after your wound heals, cover your wound with clothing or apply sunscreen of at least 30 SPF whenever you are outside.  · Do not scratch or pick at your wound.  · Avoid stretching your wound.  · Raise (elevate) the injured area above the level of your heart while you are sitting or lying down, if possible.  · Drink enough fluids to keep your urine clear or pale yellow.  · Keep all follow-up visits as told by your health care provider. This is important.  Contact a health care provider if:  · You received a tetanus shot and you have swelling, severe pain, redness, or bleeding at the injection site.  · Your wound breaks open.  · You have redness, swelling, or pain around your wound.  · You have fluid or blood coming from your wound.  · Your wound feels warm to the touch.  · You have a fever.  · You notice something coming out of your wound, such as wood or glass.  · You have pain that does not get better with medicine.  · The skin near your wound changes color.  · You need to change your dressing very frequently due to a lot of fluid, blood, or pus draining from the wound.  · You develop a new rash.  · You develop numbness around the wound.  Get help right away if:  · You develop severe swelling around your wound.  · You have pus or a bad smell coming from your wound.  · Your pain suddenly gets worse and is severe.  · You develop painful lumps near your wound or anywhere on your  body.  · You have a red streak going away from your wound.  · The wound is on your hand or foot and:  ? You cannot properly move a finger or toe.  ? Your fingers or toes look pale or bluish.  ? You have numbness that is spreading down your hand, foot, fingers, or toes.  Summary  · Sutures are stitches that can be used to close wounds.  · Taking care of your wound properly can help to prevent pain and infection.  · Keep the wound completely dry for the first 24 hours, or for as long as directed by your health care provider. After 24-48 hours, you may shower or bathe as directed by your health care provider.  This information is not intended to replace advice given to you by your health care provider. Make sure you discuss any questions you have with your health care provider.  Document Released: 01/25/2006 Document Revised: 11/30/2018 Document Reviewed: 01/23/2018  [employer] Patient Education © 2020 [employer]

## 2020-09-17 NOTE — PROGRESS NOTES
Yoly Cam is a 24 y.o. female who presents today for Suspicious Skin Lesion (On left side of chest)      Patient is here for evaluation of a nevus underneath her left breast.  Patient states that is been there for years but over the last several months it has been increasing in size and is caused her some discomfort due to rubbing on her bra.  The borders have become irregular over time giving it a halo-like appearance outside the main part of the nevus.       Review of Systems   Constitutional: Negative for fever and unexpected weight loss.   HENT: Negative for congestion, ear pain and sore throat.    Eyes: Negative for visual disturbance.   Respiratory: Negative for cough, shortness of breath and wheezing.    Cardiovascular: Negative for chest pain and palpitations.   Gastrointestinal: Negative for abdominal pain, blood in stool, constipation, diarrhea, nausea, vomiting and GERD.   Endocrine: Negative for polydipsia and polyuria.   Genitourinary: Negative for difficulty urinating.   Musculoskeletal: Negative for joint swelling.   Skin: Positive for skin lesions. Negative for rash.   Allergic/Immunologic: Negative for environmental allergies.   Neurological: Negative for seizures and syncope.   Hematological: Does not bruise/bleed easily.   Psychiatric/Behavioral: Negative for suicidal ideas.        The following portions of the patient's history were reviewed and updated as appropriate: allergies, current medications, past family history, past medical history, past social history, past surgical history and problem list.    No current outpatient medications on file prior to visit.     No current facility-administered medications on file prior to visit.        Allergies   Allergen Reactions   • Ceclor [Cefaclor] Hives        Visit Vitals  /68        Physical Exam  Skin:                   Biopsy    Date/Time: 9/17/2020 1:01 PM  Performed by: Everette Ham MD  Authorized by: Jitendra  Everette MCCAIN MD     Procedure Details - Skin Biopsy:     Body area: trunk    Trunk location: chest (chest wall below left breast)    Initial size (mm): 0.8    Final defect size (mm): 20    Malignancy: malignancy unknown      Destruction method comment: Biopsy with scalpel    Comments:   Consent was given by the patient. Informed consent was obtained through verbal consent and written consent consent. The risks of the procedure were discussed including but not limited to bleeding, infection, anesthetic risk, pain and scarring. Alternatives including observation, referral and alternative treatments were discussed. The procedure was explained and questions answered to patient or proxy's satisfaction.  Relevant documents were present and verified.     Procedure:  A biopsy with a scalpel was performed. The total number of sites biopsied: 1.   Head/neck location: chest wall below left breast.  Lidocaine 2% with epinephrine was injected for anesthesia. 5 cc's were injected. Hemostasis was obtained with suture. The site was closed with 4-0 Vicryl (5 matress and one standard interuppted suture). The specimen was sent for pathology. After the procedure, the site was to have no obvious complications and good hemostasis. The wound was treated with a bandaid. The procedure was tolerated well with no immediate complications.           Problems Addressed this Visit        Musculoskeletal and Integument    Atypical nevus of thoracic region - Primary     Atypical nevus which has enlarged in size as well as showing some changes such as irregularity and border and discomfort.  Feel that is most appropriate to remove this to send for pathology to ensure there is not some sort of malignancy developing.  See procedure note above.  Patient tolerated removal well with no immediate complication.  Patient will follow-up in 10 days to have sutures removed.  We will contact her with pathology report.  Patient was given handout on wound care.                  Return in about 10 days (around 9/27/2020) for Procedure suture removal.    Parts of this office note have been dictated by voice recognition software. Grammatical and/or spelling errors may be present.    Everette Ham MD   9/17/2020

## 2020-09-17 NOTE — ASSESSMENT & PLAN NOTE
Atypical nevus which has enlarged in size as well as showing some changes such as irregularity and border and discomfort.  Feel that is most appropriate to remove this to send for pathology to ensure there is not some sort of malignancy developing.  See procedure note above.  Patient tolerated removal well with no immediate complication.  Patient will follow-up in 10 days to have sutures removed.  We will contact her with pathology report.  Patient was given handout on wound care.

## 2020-09-21 ENCOUNTER — LAB (OUTPATIENT)
Dept: FAMILY MEDICINE CLINIC | Facility: CLINIC | Age: 25
End: 2020-09-21

## 2020-09-21 DIAGNOSIS — N92.6 MISSED PERIOD: ICD-10-CM

## 2020-09-21 DIAGNOSIS — N92.6 MISSED PERIOD: Primary | ICD-10-CM

## 2020-09-21 PROCEDURE — 84703 CHORIONIC GONADOTROPIN ASSAY: CPT | Performed by: FAMILY MEDICINE

## 2020-09-22 LAB — HCG SERPL QL: NEGATIVE

## 2020-09-23 DIAGNOSIS — D23.5 DYSPLASTIC NEVUS OF TRUNK: Primary | ICD-10-CM

## 2020-10-27 ENCOUNTER — OFFICE VISIT (OUTPATIENT)
Dept: FAMILY MEDICINE CLINIC | Facility: CLINIC | Age: 25
End: 2020-10-27

## 2020-10-27 DIAGNOSIS — S05.01XA CONJUNCTIVAL ABRASION, RIGHT, INITIAL ENCOUNTER: Primary | ICD-10-CM

## 2020-10-27 PROCEDURE — 99213 OFFICE O/P EST LOW 20 MIN: CPT | Performed by: FAMILY MEDICINE

## 2020-11-01 NOTE — PROGRESS NOTES
Subjective   Yoly Cam is a 24 y.o. female seen today for No chief complaint on file..  Irritation and drainage of the right eye.    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  She awakened this morning with irritation and drainage from her right eye.  She uses contacts.  She was unable to locate the contact in the right eye.  Her vision is unaffected.  She did have some drainage earlier but that has slowed down here at the office.    The following portions of the patient's history were reviewed and updated as appropriate: allergies, current medications, past social history and problem list.    Review of Systems   Constitutional: Negative for chills and fever.   Eyes: Positive for pain, discharge and redness. Negative for photophobia, itching and visual disturbance.       Objective   There were no vitals taken for this visit.  Physical Exam  Eyes:      Comments: Examination of the left eye reveals no abnormality.  Examination of the right eye reveals some mild conjunctival injection at the upper portion of the conjunctiva just above the cornea.    After fluorescein staining with tetracaine anesthesia, there was evidence of a small abrasion at the 11 and 12 o'clock position of the bulbar conjunctiva.  No foreign body was located either on the general conjunctiva or under the eyelid.  The eyelid was everted.    The fluorescein staining was a rinsed off.         Assessment/Plan   Problems Addressed this Visit     None      Visit Diagnoses     Conjunctival abrasion, right, initial encounter    -  Primary      Diagnoses       Codes Comments    Conjunctival abrasion, right, initial encounter    -  Primary ICD-10-CM: S05.01XA  ICD-9-CM: 918.2         I see no need for treatment with any eyedrops.  The patient already feels much better after irrigation.  There is no evidence of a foreign body.  We will not  pursue an eye patch.

## 2020-12-18 ENCOUNTER — OFFICE VISIT (OUTPATIENT)
Dept: FAMILY MEDICINE CLINIC | Facility: CLINIC | Age: 25
End: 2020-12-18

## 2020-12-18 VITALS
HEART RATE: 80 BPM | WEIGHT: 185 LBS | TEMPERATURE: 97.3 F | SYSTOLIC BLOOD PRESSURE: 116 MMHG | OXYGEN SATURATION: 99 % | HEIGHT: 68 IN | DIASTOLIC BLOOD PRESSURE: 72 MMHG | BODY MASS INDEX: 28.04 KG/M2 | RESPIRATION RATE: 16 BRPM

## 2020-12-18 DIAGNOSIS — J02.9 SORE THROAT: ICD-10-CM

## 2020-12-18 DIAGNOSIS — Z20.818 EXPOSURE TO STREP THROAT: ICD-10-CM

## 2020-12-18 DIAGNOSIS — J01.00 ACUTE NON-RECURRENT MAXILLARY SINUSITIS: Primary | ICD-10-CM

## 2020-12-18 LAB
EXPIRATION DATE: NORMAL
INTERNAL CONTROL: NORMAL
Lab: NORMAL
S PYO AG THROAT QL: NEGATIVE

## 2020-12-18 PROCEDURE — 87880 STREP A ASSAY W/OPTIC: CPT | Performed by: NURSE PRACTITIONER

## 2020-12-18 PROCEDURE — 99213 OFFICE O/P EST LOW 20 MIN: CPT | Performed by: NURSE PRACTITIONER

## 2020-12-18 RX ORDER — AZITHROMYCIN 250 MG/1
TABLET, FILM COATED ORAL
Qty: 6 TABLET | Refills: 0 | Status: SHIPPED | OUTPATIENT
Start: 2020-12-18 | End: 2020-12-23

## 2020-12-18 RX ORDER — FLUCONAZOLE 150 MG/1
150 TABLET ORAL DAILY
Qty: 2 TABLET | Refills: 0 | OUTPATIENT
Start: 2020-12-18 | End: 2021-01-01

## 2020-12-18 NOTE — PROGRESS NOTES
Follow Up Office Note     Patient Name: Yoly Cam  : 1995   MRN: [id number]     Chief Complaint:    Chief Complaint   Patient presents with   • Sore Throat       History of Present Illness: Yoly Cam is a 25 y.o. female who presents today with c/o sore throat, exposure to strep.    Sore Throat   This is a new problem. The current episode started in the past 7 days. The problem has been gradually worsening. There has been no fever. The pain is moderate. Associated symptoms include congestion, ear pain and a plugged ear sensation. Pertinent negatives include no abdominal pain, coughing, diarrhea, drooling, ear discharge, hoarse voice, neck pain, shortness of breath, stridor, swollen glands, trouble swallowing or vomiting. She has had exposure to strep. She has tried cool liquids for the symptoms. The treatment provided no relief.        Subjective      Review of Systems:   Review of Systems   Constitutional: Negative for activity change, appetite change, chills, diaphoresis, fatigue and fever.   HENT: Positive for congestion, ear pain, postnasal drip, sinus pressure, sinus pain and sore throat. Negative for drooling, ear discharge, facial swelling, hoarse voice and trouble swallowing.    Respiratory: Negative for cough, chest tightness, shortness of breath and stridor.    Cardiovascular: Negative for chest pain and palpitations.   Gastrointestinal: Negative for abdominal pain, diarrhea, nausea and vomiting.   Musculoskeletal: Negative for arthralgias, myalgias and neck pain.   Skin: Negative for pallor and rash.   Neurological: Negative for dizziness and light-headedness.        Past Medical History:   Past Medical History:   Diagnosis Date   • PCOS (polycystic ovarian syndrome)    • PFO (patent foramen ovale)          Medications:     Current Outpatient Medications:   •  azithromycin (ZITHROMAX) 250 MG tablet, Take 2 tablets the first day, then 1 tablet daily for 4 days.,  "Disp: 6 tablet, Rfl: 0  •  fluconazole (Diflucan) 150 MG tablet, Take 1 tablet by mouth Daily., Disp: 2 tablet, Rfl: 0    Allergies:   Allergies   Allergen Reactions   • Ceclor [Cefaclor] Hives         Objective     Physical Exam:  Vital Signs:   Vitals:    12/18/20 1136   BP: 116/72   BP Location: Left arm   Patient Position: Sitting   Cuff Size: Adult   Pulse: 80   Resp: 16   Temp: 97.3 °F (36.3 °C)   TempSrc: Temporal   SpO2: 99%   Weight: 83.9 kg (185 lb)   Height: 172.7 cm (68\")   PainSc:   8   PainLoc: Throat     Body mass index is 28.13 kg/m².     Physical Exam  Vitals signs and nursing note reviewed.   Constitutional:       General: She is not in acute distress.     Appearance: Normal appearance. She is well-developed. She is not ill-appearing, toxic-appearing or diaphoretic.   HENT:      Head: Normocephalic and atraumatic.      Right Ear: External ear normal. A middle ear effusion is present. Tympanic membrane is bulging.      Left Ear: External ear normal. A middle ear effusion is present. Tympanic membrane is bulging.      Nose: Mucosal edema and congestion present.      Right Turbinates: Swollen.      Left Turbinates: Swollen.      Mouth/Throat:      Lips: Pink.      Mouth: Mucous membranes are moist.      Pharynx: Uvula midline. Posterior oropharyngeal erythema present.   Neck:      Musculoskeletal: Normal range of motion and neck supple. No neck rigidity or muscular tenderness.   Cardiovascular:      Rate and Rhythm: Normal rate and regular rhythm.      Heart sounds: Normal heart sounds.   Pulmonary:      Effort: Pulmonary effort is normal. No respiratory distress.      Breath sounds: Normal breath sounds. No stridor. No wheezing.   Lymphadenopathy:      Cervical: No cervical adenopathy.   Skin:     General: Skin is warm and dry.   Neurological:      Mental Status: She is alert and oriented to person, place, and time.   Psychiatric:         Mood and Affect: Mood normal.         Behavior: Behavior " normal. Behavior is cooperative.         Thought Content: Thought content normal.         Judgment: Judgment normal.         Assessment / Plan      Assessment/Plan:   Diagnoses and all orders for this visit:    1. Acute non-recurrent maxillary sinusitis (Primary)  -     azithromycin (ZITHROMAX) 250 MG tablet; Take 2 tablets the first day, then 1 tablet daily for 4 days.  Dispense: 6 tablet; Refill: 0  -     fluconazole (Diflucan) 150 MG tablet; Take 1 tablet by mouth Daily.  Dispense: 2 tablet; Refill: 0    2. Sore throat  -     POC Rapid Strep A    3. Exposure to strep throat  -     POC Rapid Strep A    Rapid strep screen negative.      Follow Up:   PRN and at next scheduled appointment(s) with PCP.    Discussed the nature of the medical condition(s) risks, complications, implications, management, safe and proper use of medications. Encouraged medication compliance, and keeping scheduled follow up appointments with me and any other providers.      RTC if symptoms fail to improve, to ER if symptoms worsen.      MILA Ayala  [facility] [place]       Please note that portions of this note may have been completed with a voice recognition program. Efforts were made to edit the dictations, but occasionally words are mistranscribed.

## 2020-12-18 NOTE — PATIENT INSTRUCTIONS
Sore Throat  A sore throat is pain, burning, irritation, or scratchiness in the throat. When you have a sore throat, you may feel pain or tenderness in your throat when you swallow or talk.  Many things can cause a sore throat, including:  · An infection.  · Seasonal allergies.  · Dryness in the air.  · Irritants, such as smoke or pollution.  · Radiation treatment to the area.  · Gastroesophageal reflux disease (GERD).  · A tumor.  A sore throat is often the first sign of another sickness. It may happen with other symptoms, such as coughing, sneezing, fever, and swollen neck glands. Most sore throats go away without medical treatment.  Follow these instructions at home:         · Take over-the-counter medicines only as told by your health care provider.  ? If your child has a sore throat, do not give your child aspirin because of the association with Reye syndrome.  · Drink enough fluids to keep your urine pale yellow.  · Rest as needed.  · To help with pain, try:  ? Sipping warm liquids, such as broth, herbal tea, or warm water.  ? Eating or drinking cold or frozen liquids, such as frozen ice pops.  ? Gargling with a salt-water mixture 3-4 times a day or as needed. To make a salt-water mixture, completely dissolve ½-1 tsp (3-6 g) of salt in 1 cup (237 mL) of warm water.  ? Sucking on hard candy or throat lozenges.  ? Putting a cool-mist humidifier in your bedroom at night to moisten the air.  ? Sitting in the bathroom with the door closed for 5-10 minutes while you run hot water in the shower.  · Do not use any products that contain nicotine or tobacco, such as cigarettes, e-cigarettes, and chewing tobacco. If you need help quitting, ask your health care provider.  · Wash your hands well and often with soap and water. If soap and water are not available, use hand .  Contact a health care provider if:  · You have a fever for more than 2-3 days.  · You have symptoms that last (are persistent) for more than  2-3 days.  · Your throat does not get better within 7 days.  · You have a fever and your symptoms suddenly get worse.  · Your child who is 3 months to 3 years old has a temperature of 102.2°F (39°C) or higher.  Get help right away if:  · You have difficulty breathing.  · You cannot swallow fluids, soft foods, or your saliva.  · You have increased swelling in your throat or neck.  · You have persistent nausea and vomiting.  Summary  · A sore throat is pain, burning, irritation, or scratchiness in the throat. Many things can cause a sore throat.  · Take over-the-counter medicines only as told by your health care provider. Do not give your child aspirin.  · Drink plenty of fluids, and rest as needed.  · Contact a health care provider if your symptoms worsen or your sore throat does not get better within 7 days.  This information is not intended to replace advice given to you by your health care provider. Make sure you discuss any questions you have with your health care provider.  Document Revised: 05/20/2019 Document Reviewed: 05/20/2019  [email or web address] Patient Education © 2020 [email or web address]    Sinusitis, Adult  Sinusitis is inflammation of your sinuses. Sinuses are hollow spaces in the bones around your face. Your sinuses are located:  · Around your eyes.  · In the middle of your forehead.  · Behind your nose.  · In your cheekbones.  Mucus normally drains out of your sinuses. When your nasal tissues become inflamed or swollen, mucus can become trapped or blocked. This allows bacteria, viruses, and fungi to grow, which leads to infection. Most infections of the sinuses are caused by a virus.  Sinusitis can develop quickly. It can last for up to 4 weeks (acute) or for more than 12 weeks (chronic). Sinusitis often develops after a cold.  What are the causes?  This condition is caused by anything that creates swelling in the sinuses or stops mucus from draining. This includes:  · Allergies.  · Asthma.  · Infection from  bacteria or viruses.  · Deformities or blockages in your nose or sinuses.  · Abnormal growths in the nose (nasal polyps).  · Pollutants, such as chemicals or irritants in the air.  · Infection from fungi (rare).  What increases the risk?  You are more likely to develop this condition if you:  · Have a weak body defense system (immune system).  · Do a lot of swimming or diving.  · Overuse nasal sprays.  · Smoke.  What are the signs or symptoms?  The main symptoms of this condition are pain and a feeling of pressure around the affected sinuses. Other symptoms include:  · Stuffy nose or congestion.  · Thick drainage from your nose.  · Swelling and warmth over the affected sinuses.  · Headache.  · Upper toothache.  · A cough that may get worse at night.  · Extra mucus that collects in the throat or the back of the nose (postnasal drip).  · Decreased sense of smell and taste.  · Fatigue.  · A fever.  · Sore throat.  · Bad breath.  How is this diagnosed?  This condition is diagnosed based on:  · Your symptoms.  · Your medical history.  · A physical exam.  · Tests to find out if your condition is acute or chronic. This may include:  ? Checking your nose for nasal polyps.  ? Viewing your sinuses using a device that has a light (endoscope).  ? Testing for allergies or bacteria.  ? Imaging tests, such as an MRI or CT scan.  In rare cases, a bone biopsy may be done to rule out more serious types of fungal sinus disease.  How is this treated?  Treatment for sinusitis depends on the cause and whether your condition is chronic or acute.  · If caused by a virus, your symptoms should go away on their own within 10 days. You may be given medicines to relieve symptoms. They include:  ? Medicines that shrink swollen nasal passages (topical intranasal decongestants).  ? Medicines that treat allergies (antihistamines).  ? A spray that eases inflammation of the nostrils (topical intranasal corticosteroids).  ? Rinses that help get rid of  thick mucus in your nose (nasal saline washes).  · If caused by bacteria, your health care provider may recommend waiting to see if your symptoms improve. Most bacterial infections will get better without antibiotic medicine. You may be given antibiotics if you have:  ? A severe infection.  ? A weak immune system.  · If caused by narrow nasal passages or nasal polyps, you may need to have surgery.  Follow these instructions at home:  Medicines  · Take, use, or apply over-the-counter and prescription medicines only as told by your health care provider. These may include nasal sprays.  · If you were prescribed an antibiotic medicine, take it as told by your health care provider. Do not stop taking the antibiotic even if you start to feel better.  Hydrate and humidify    · Drink enough fluid to keep your urine pale yellow. Staying hydrated will help to thin your mucus.  · Use a cool mist humidifier to keep the humidity level in your home above 50%.  · Inhale steam for 10-15 minutes, 3-4 times a day, or as told by your health care provider. You can do this in the bathroom while a hot shower is running.  · Limit your exposure to cool or dry air.  Rest  · Rest as much as possible.  · Sleep with your head raised (elevated).  · Make sure you get enough sleep each night.  General instructions    · Apply a warm, moist washcloth to your face 3-4 times a day or as told by your health care provider. This will help with discomfort.  · Wash your hands often with soap and water to reduce your exposure to germs. If soap and water are not available, use hand .  · Do not smoke. Avoid being around people who are smoking (secondhand smoke).  · Keep all follow-up visits as told by your health care provider. This is important.  Contact a health care provider if:  · You have a fever.  · Your symptoms get worse.  · Your symptoms do not improve within 10 days.  Get help right away if:  · You have a severe headache.  · You have  persistent vomiting.  · You have severe pain or swelling around your face or eyes.  · You have vision problems.  · You develop confusion.  · Your neck is stiff.  · You have trouble breathing.  Summary  · Sinusitis is soreness and inflammation of your sinuses. Sinuses are hollow spaces in the bones around your face.  · This condition is caused by nasal tissues that become inflamed or swollen. The swelling traps or blocks the flow of mucus. This allows bacteria, viruses, and fungi to grow, which leads to infection.  · If you were prescribed an antibiotic medicine, take it as told by your health care provider. Do not stop taking the antibiotic even if you start to feel better.  · Keep all follow-up visits as told by your health care provider. This is important.  This information is not intended to replace advice given to you by your health care provider. Make sure you discuss any questions you have with your health care provider.  Document Revised: 05/20/2019 Document Reviewed: 05/20/2019  [employer] Patient Education © 2020 [employer]  Azithromycin tablets  What is this medicine?  AZITHROMYCIN (az ith marilyn MYE sin) is a macrolide antibiotic. It is used to treat or prevent certain kinds of bacterial infections. It will not work for colds, flu, or other viral infections.  This medicine may be used for other purposes; ask your health care provider or pharmacist if you have questions.  COMMON BRAND NAME(S): Zithromax, Zithromax Tri-Lorenzo, Zithromax Z-Lorenzo  What should I tell my health care provider before I take this medicine?  They need to know if you have any of these conditions:  · history of blood diseases, like leukemia  · history of irregular heartbeat  · kidney disease  · liver disease  · myasthenia gravis  · an unusual or allergic reaction to azithromycin, erythromycin, other macrolide antibiotics, foods, dyes, or preservatives  · pregnant or trying to get pregnant  · breast-feeding  How should I use this  medicine?  Take this medicine by mouth with a full glass of water. Follow the directions on the prescription label. The tablets can be taken with food or on an empty stomach. If the medicine upsets your stomach, take it with food. Take your medicine at regular intervals. Do not take your medicine more often than directed. Take all of your medicine as directed even if you think your are better. Do not skip doses or stop your medicine early.  Talk to your pediatrician regarding the use of this medicine in children. While this drug may be prescribed for children as young as 6 months for selected conditions, precautions do apply.  Overdosage: If you think you have taken too much of this medicine contact a poison control center or emergency room at once.  NOTE: This medicine is only for you. Do not share this medicine with others.  What if I miss a dose?  If you miss a dose, take it as soon as you can. If it is almost time for your next dose, take only that dose. Do not take double or extra doses.  What may interact with this medicine?  Do not take this medicine with any of the following medications:  · cisapride  · dronedarone  · pimozide  · thioridazine  This medicine may also interact with the following medications:  · antacids that contain aluminum or magnesium  · birth control pills  · colchicine  · cyclosporine  · digoxin  · ergot alkaloids like dihydroergotamine, ergotamine  · nelfinavir  · other medicines that prolong the QT interval (an abnormal heart rhythm)  · phenytoin  · warfarin  This list may not describe all possible interactions. Give your health care provider a list of all the medicines, herbs, non-prescription drugs, or dietary supplements you use. Also tell them if you smoke, drink alcohol, or use illegal drugs. Some items may interact with your medicine.  What should I watch for while using this medicine?  Tell your doctor or healthcare provider if your symptoms do not start to get better or if they  get worse.  This medicine may cause serious skin reactions. They can happen weeks to months after starting the medicine. Contact your healthcare provider right away if you notice fevers or flu-like symptoms with a rash. The rash may be red or purple and then turn into blisters or peeling of the skin. Or, you might notice a red rash with swelling of the face, lips or lymph nodes in your neck or under your arms.  Do not treat diarrhea with over the counter products. Contact your doctor if you have diarrhea that lasts more than 2 days or if it is severe and watery.  This medicine can make you more sensitive to the sun. Keep out of the sun. If you cannot avoid being in the sun, wear protective clothing and use sunscreen. Do not use sun lamps or tanning beds/booths.  What side effects may I notice from receiving this medicine?  Side effects that you should report to your doctor or health care professional as soon as possible:  · allergic reactions like skin rash, itching or hives, swelling of the face, lips, or tongue  · bloody or watery diarrhea  · breathing problems  · chest pain  · fast, irregular heartbeat  · muscle weakness  · rash, fever, and swollen lymph nodes  · redness, blistering, peeling, or loosening of the skin, including inside the mouth  · signs and symptoms of liver injury like dark yellow or brown urine; general ill feeling or flu-like symptoms; light-colored stools; loss of appetite; nausea; right upper belly pain; unusually weak or tired; yellowing of the eyes or skin  · white patches or sores in the mouth  · unusually weak or tired  Side effects that usually do not require medical attention (report to your doctor or health care professional if they continue or are bothersome):  · diarrhea  · nausea  · stomach pain  · vomiting  This list may not describe all possible side effects. Call your doctor for medical advice about side effects. You may report side effects to FDA at 3-241-GBF-9961.  Where  should I keep my medicine?  Keep out of the reach of children.  Store at room temperature between 15 and 30 degrees C (59 and 86 degrees F). Throw away any unused medicine after the expiration date.  NOTE: This sheet is a summary. It may not cover all possible information. If you have questions about this medicine, talk to your doctor, pharmacist, or health care provider.  © 2020 [employer] (2020-03-26 17:19:20)  Fluconazole tablets  What is this medicine?  FLUCONAZOLE (floo LEONORA na zole) is an antifungal medicine. It is used to treat certain kinds of fungal or yeast infections.  This medicine may be used for other purposes; ask your health care provider or pharmacist if you have questions.  COMMON BRAND NAME(S): Diflucan  What should I tell my health care provider before I take this medicine?  They need to know if you have any of these conditions:  · history of irregular heart beat  · kidney disease  · an unusual or allergic reaction to fluconazole, other azole antifungals, medicines, foods, dyes, or preservatives  · pregnant or trying to get pregnant  · breast-feeding  How should I use this medicine?  Take this medicine by mouth. Follow the directions on the prescription label. Do not take your medicine more often than directed.  Talk to your pediatrician regarding the use of this medicine in children. Special care may be needed. This medicine has been used in children as young as 6 months of age.  Overdosage: If you think you have taken too much of this medicine contact a poison control center or emergency room at once.  NOTE: This medicine is only for you. Do not share this medicine with others.  What if I miss a dose?  If you miss a dose, take it as soon as you can. If it is almost time for your next dose, take only that dose. Do not take double or extra doses.  What may interact with this medicine?  Do not take this medicine with any of the following medications:  · astemizole  · certain medicines  for irregular heart beat like dronedarone, quinidine  · cisapride  · erythromycin  · lomitapide  · other medicines that prolong the QT interval (cause an abnormal heart rhythm)  · pimozide  · terfenadine  · thioridazine  This medicine may also interact with the following medications:  · antiviral medicines for HIV or AIDS  · birth control pills  · certain antibiotics like rifabutin, rifampin  · certain medicines for blood pressure like amlodipine, isradipine, felodipine, hydrochlorothiazide, losartan, nifedipine  · certain medicines for cancer like cyclophosphamide, ibrutinib, vinblastine, vincristine  · certain medicines for cholesterol like atorvastatin, lovastatin, fluvastatin, simvastatin  · certain medicines for depression, anxiety, or psychotic disturbances like amitriptyline, midazolam, nortriptyline, triazolam  · certain medicines for diabetes like glipizide, glyburide, tolbutamide  · certain medicines for pain like alfentanil, fentanyl, methadone  · certain medicines for seizures like carbamazepine, phenytoin  · certain medicines that treat or prevent blood clots like warfarin  · dofetilide  · halofantrine  · medicines that lower your chance of fighting infection like cyclosporine, prednisone, tacrolimus  · NSAIDS, medicines for pain and inflammation, like celecoxib, diclofenac, flurbiprofen, ibuprofen, meloxicam, naproxen  · other medicines for fungal infections  · sirolimus  · theophylline  · tofacitinib  · tolvaptan  · ziprasidone  This list may not describe all possible interactions. Give your health care provider a list of all the medicines, herbs, non-prescription drugs, or dietary supplements you use. Also tell them if you smoke, drink alcohol, or use illegal drugs. Some items may interact with your medicine.  What should I watch for while using this medicine?  Visit your doctor or health care professional for regular checkups. If you are taking this medicine for a long time you may need blood work.  Tell your doctor if your symptoms do not improve. Some fungal infections need many weeks or months of treatment to cure.  Alcohol can increase possible damage to your liver. Avoid alcoholic drinks.  If you have a vaginal infection, do not have sex until you have finished your treatment. You can wear a sanitary napkin. Do not use tampons. Wear freshly washed cotton, not synthetic, panties.  What side effects may I notice from receiving this medicine?  Side effects that you should report to your doctor or health care professional as soon as possible:  · allergic reactions like skin rash or itching, hives, swelling of the lips, mouth, tongue, or throat  · dark urine  · feeling dizzy or faint  · irregular heartbeat or chest pain  · redness, blistering, peeling or loosening of the skin, including inside the mouth  · trouble breathing  · unusual bruising or bleeding  · vomiting  · yellowing of the eyes or skin  Side effects that usually do not require medical attention (report to your doctor or health care professional if they continue or are bothersome):  · changes in how food tastes  · diarrhea  · headache  · stomach upset or nausea  This list may not describe all possible side effects. Call your doctor for medical advice about side effects. You may report side effects to FDA at 5-308-FDA-8280.  Where should I keep my medicine?  Keep out of the reach of children.  Store at room temperature below 30 degrees C (86 degrees F). Throw away any medicine after the expiration date.  NOTE: This sheet is a summary. It may not cover all possible information. If you have questions about this medicine, talk to your doctor, pharmacist, or health care provider.  © 2020 [employer] (2020-09-10 11:41:56)

## 2021-01-01 PROCEDURE — U0004 COV-19 TEST NON-CDC HGH THRU: HCPCS | Performed by: PHYSICIAN ASSISTANT

## 2021-01-03 ENCOUNTER — TELEPHONE (OUTPATIENT)
Dept: URGENT CARE | Facility: CLINIC | Age: 26
End: 2021-01-03

## 2021-01-03 NOTE — TELEPHONE ENCOUNTER
Patient called requesting Covid results. Results were given over the phone verbally. All questions were answered during this phone call.

## 2021-01-07 ENCOUNTER — LAB (OUTPATIENT)
Dept: FAMILY MEDICINE CLINIC | Facility: CLINIC | Age: 26
End: 2021-01-07

## 2021-01-07 DIAGNOSIS — Z20.822 CLOSE EXPOSURE TO COVID-19 VIRUS: Primary | ICD-10-CM

## 2021-01-07 DIAGNOSIS — Z20.822 CLOSE EXPOSURE TO COVID-19 VIRUS: ICD-10-CM

## 2021-01-07 PROCEDURE — U0003 INFECTIOUS AGENT DETECTION BY NUCLEIC ACID (DNA OR RNA); SEVERE ACUTE RESPIRATORY SYNDROME CORONAVIRUS 2 (SARS-COV-2) (CORONAVIRUS DISEASE [COVID-19]), AMPLIFIED PROBE TECHNIQUE, MAKING USE OF HIGH THROUGHPUT TECHNOLOGIES AS DESCRIBED BY CMS-2020-01-R: HCPCS | Performed by: FAMILY MEDICINE

## 2021-01-10 LAB — SARS-COV-2 RNA RESP QL NAA+PROBE: NOT DETECTED

## 2021-01-11 ENCOUNTER — IMMUNIZATION (OUTPATIENT)
Dept: VACCINE CLINIC | Facility: HOSPITAL | Age: 26
End: 2021-01-11

## 2021-01-11 PROCEDURE — 0001A: CPT | Performed by: INTERNAL MEDICINE

## 2021-01-11 PROCEDURE — 91300 HC SARSCOV02 VAC 30MCG/0.3ML IM: CPT | Performed by: INTERNAL MEDICINE

## 2021-02-01 ENCOUNTER — IMMUNIZATION (OUTPATIENT)
Dept: VACCINE CLINIC | Facility: HOSPITAL | Age: 26
End: 2021-02-01

## 2021-02-01 PROCEDURE — 0002A: CPT | Performed by: INTERNAL MEDICINE

## 2021-02-01 PROCEDURE — 91300 HC SARSCOV02 VAC 30MCG/0.3ML IM: CPT | Performed by: INTERNAL MEDICINE

## 2021-03-03 ENCOUNTER — OFFICE VISIT (OUTPATIENT)
Dept: FAMILY MEDICINE CLINIC | Facility: CLINIC | Age: 26
End: 2021-03-03

## 2021-03-03 VITALS
DIASTOLIC BLOOD PRESSURE: 76 MMHG | HEIGHT: 67 IN | HEART RATE: 100 BPM | SYSTOLIC BLOOD PRESSURE: 108 MMHG | WEIGHT: 188 LBS | TEMPERATURE: 98.7 F | OXYGEN SATURATION: 98 % | BODY MASS INDEX: 29.51 KG/M2

## 2021-03-03 DIAGNOSIS — J02.9 SORE THROAT: Primary | ICD-10-CM

## 2021-03-03 DIAGNOSIS — J02.0 STREP PHARYNGITIS: ICD-10-CM

## 2021-03-03 DIAGNOSIS — H65.193 OTHER ACUTE NONSUPPURATIVE OTITIS MEDIA OF BOTH EARS, RECURRENCE NOT SPECIFIED: ICD-10-CM

## 2021-03-03 PROBLEM — H66.93 BILATERAL OTITIS MEDIA: Status: ACTIVE | Noted: 2021-03-03

## 2021-03-03 LAB
EXPIRATION DATE: NORMAL
INTERNAL CONTROL: NORMAL
Lab: NORMAL
S PYO AG THROAT QL: NEGATIVE

## 2021-03-03 PROCEDURE — 87880 STREP A ASSAY W/OPTIC: CPT | Performed by: FAMILY MEDICINE

## 2021-03-03 PROCEDURE — 99214 OFFICE O/P EST MOD 30 MIN: CPT | Performed by: FAMILY MEDICINE

## 2021-03-03 RX ORDER — AMOXICILLIN 500 MG/1
500 CAPSULE ORAL EVERY 8 HOURS
Qty: 30 CAPSULE | Refills: 0 | Status: SHIPPED | OUTPATIENT
Start: 2021-03-03 | End: 2021-04-10

## 2021-03-03 NOTE — ASSESSMENT & PLAN NOTE
Most likely strep infection causing acute otitis media as patient is positive for strep over the weekend.  Patient was given amoxicillin.

## 2021-03-03 NOTE — ASSESSMENT & PLAN NOTE
Strep test negative in the office today with positive over the weekend.  Patient's daughter also has symptoms and test positive for strep.  I suspect her strep test here in the office today was a false negative.  Patient will be treated for strep pharyngitis with amoxicillin.  RTC/ED precautions given.

## 2021-03-03 NOTE — PROGRESS NOTES
"Yoly Cam is a 25 y.o. female who presents today for Sore Throat      Patient presents with a sore throat beginning about a week ago. Patient's daughter had a positive strep test on Friday. Patient had a positive strep test on Saturday. Patient's daughter is on antibiotics currently. Patient still has a sore throat. Patient's strep test was negative today. Patient is not taking any pain relievers for the pain. Patient had a fever on Saturday. Patient is having some ear pain on both sides, but it is worse on her left side. Patient denies cough. Patient denies taking any other antibiotics recently.       Review of Systems   Constitutional: Positive for fatigue and fever. Negative for chills.   HENT: Positive for ear pain, rhinorrhea, sore throat, swollen glands and trouble swallowing (painful swallowing). Negative for congestion and postnasal drip.    Respiratory: Negative for cough and shortness of breath.    Cardiovascular: Negative for chest pain and palpitations.        The following portions of the patient's history were reviewed and updated as appropriate: allergies, current medications, past family history, past medical history, past social history, past surgical history and problem list.    No current outpatient medications on file prior to visit.     No current facility-administered medications on file prior to visit.        Allergies   Allergen Reactions   • Ceclor [Cefaclor] Hives        Visit Vitals  /76 (BP Location: Left arm, Patient Position: Sitting, Cuff Size: Adult)   Pulse 100   Temp 98.7 °F (37.1 °C) (Temporal)   Ht 170.2 cm (67\")   Wt 85.3 kg (188 lb)   SpO2 98%   BMI 29.44 kg/m²        Physical Exam  Constitutional:       General: She is not in acute distress.     Appearance: She is well-developed. She is not diaphoretic.   HENT:      Head: Atraumatic.      Right Ear: Tympanic membrane, ear canal and external ear normal. There is no impacted cerumen.      Left Ear: Tympanic " membrane, ear canal and external ear normal. There is no impacted cerumen.      Nose: Rhinorrhea present. No congestion.      Mouth/Throat:      Mouth: Mucous membranes are moist.      Pharynx: Oropharynx is clear. Posterior oropharyngeal erythema present. No oropharyngeal exudate.   Neck:      Musculoskeletal: Normal range of motion and neck supple.   Cardiovascular:      Rate and Rhythm: Normal rate and regular rhythm.      Heart sounds: Normal heart sounds. No murmur. No friction rub. No gallop.    Pulmonary:      Effort: Pulmonary effort is normal. No respiratory distress.      Breath sounds: Normal breath sounds. No stridor. No wheezing, rhonchi or rales.   Skin:     General: Skin is warm and dry.   Neurological:      Mental Status: She is alert and oriented to person, place, and time.   Psychiatric:         Behavior: Behavior normal.          Results for orders placed or performed in visit on 03/03/21   POC Rapid Strep A    Specimen: Swab   Result Value Ref Range    Rapid Strep A Screen Negative Negative, VALID, INVALID, Not Performed    Internal Control Passed Passed    Lot Number [id number]     Expiration Date 07/16/2022         Problems Addressed this Visit        ENT    Sore throat - Primary    Relevant Medications    amoxicillin (AMOXIL) 500 MG capsule    Other Relevant Orders    POC Rapid Strep A (Completed)    Bilateral otitis media     Most likely strep infection causing acute otitis media as patient is positive for strep over the weekend.  Patient was given amoxicillin.         Relevant Medications    amoxicillin (AMOXIL) 500 MG capsule    Strep pharyngitis     Strep test negative in the office today with positive over the weekend.  Patient's daughter also has symptoms and test positive for strep.  I suspect her strep test here in the office today was a false negative.  Patient will be treated for strep pharyngitis with amoxicillin.  RTC/ED precautions given.         Relevant Medications    amoxicillin  (AMOXIL) 500 MG capsule      Diagnoses       Codes Comments    Sore throat    -  Primary ICD-10-CM: J02.9  ICD-9-CM: 462     Other acute nonsuppurative otitis media of both ears, recurrence not specified     ICD-10-CM: H65.193  ICD-9-CM: 381.00     Strep pharyngitis     ICD-10-CM: J02.0  ICD-9-CM: 034.0           Return if symptoms worsen or fail to improve.    Parts of this office note have been dictated by voice recognition software. Grammatical and/or spelling errors may be present.    Everette Ham MD   3/3/2021

## 2021-03-15 ENCOUNTER — OFFICE VISIT (OUTPATIENT)
Dept: FAMILY MEDICINE CLINIC | Facility: CLINIC | Age: 26
End: 2021-03-15

## 2021-03-15 VITALS
RESPIRATION RATE: 20 BRPM | BODY MASS INDEX: 29.51 KG/M2 | SYSTOLIC BLOOD PRESSURE: 110 MMHG | HEIGHT: 67 IN | TEMPERATURE: 99.1 F | OXYGEN SATURATION: 98 % | HEART RATE: 95 BPM | DIASTOLIC BLOOD PRESSURE: 72 MMHG | WEIGHT: 188 LBS

## 2021-03-15 DIAGNOSIS — Z23 NEED FOR VACCINATION: ICD-10-CM

## 2021-03-15 DIAGNOSIS — R09.81 CONGESTION OF NASAL SINUS: ICD-10-CM

## 2021-03-15 DIAGNOSIS — Z20.828 EXPOSURE TO THE FLU: ICD-10-CM

## 2021-03-15 DIAGNOSIS — J02.9 SORE THROAT: Primary | ICD-10-CM

## 2021-03-15 DIAGNOSIS — R05.9 COUGH: ICD-10-CM

## 2021-03-15 LAB
EXPIRATION DATE: NORMAL
EXPIRATION DATE: NORMAL
FLUAV AG NPH QL: NEGATIVE
FLUBV AG NPH QL: NEGATIVE
INTERNAL CONTROL: NORMAL
INTERNAL CONTROL: NORMAL
Lab: NORMAL
Lab: NORMAL
S PYO AG THROAT QL: NEGATIVE

## 2021-03-15 PROCEDURE — U0004 COV-19 TEST NON-CDC HGH THRU: HCPCS | Performed by: NURSE PRACTITIONER

## 2021-03-15 PROCEDURE — 99213 OFFICE O/P EST LOW 20 MIN: CPT | Performed by: NURSE PRACTITIONER

## 2021-03-15 PROCEDURE — 87804 INFLUENZA ASSAY W/OPTIC: CPT | Performed by: NURSE PRACTITIONER

## 2021-03-15 PROCEDURE — 87880 STREP A ASSAY W/OPTIC: CPT | Performed by: NURSE PRACTITIONER

## 2021-03-15 RX ORDER — ALBUTEROL SULFATE 90 UG/1
2 AEROSOL, METERED RESPIRATORY (INHALATION) EVERY 4 HOURS PRN
Qty: 18 G | Refills: 1 | Status: SHIPPED | OUTPATIENT
Start: 2021-03-15 | End: 2021-05-10 | Stop reason: ALTCHOICE

## 2021-03-15 RX ORDER — OSELTAMIVIR PHOSPHATE 75 MG/1
75 CAPSULE ORAL DAILY
Qty: 10 CAPSULE | Refills: 0 | Status: SHIPPED | OUTPATIENT
Start: 2021-03-15 | End: 2021-04-10

## 2021-03-15 NOTE — PROGRESS NOTES
"Chief Complaint  URI    Subjective          Yoly Cam presents to [facility]  History of Present Illness   Daughter developed cough and fever on 3/13/21. Diagnosed with flu B. Patient developed chest and nasal conagestion, headache and myalgia yesterday. No fever. Did not take any meds. Slight cough, no wheezing, has pain in sternal area. Worse with exertion. Checking oxygen at home and running 98%. Does not typically get chest infections.  No known covid exposures. She has had both covid vaccines.       Objective   Vital Signs:   /72 (BP Location: Left arm, Patient Position: Sitting, Cuff Size: Adult)   Pulse 95   Temp 99.1 °F (37.3 °C)   Resp 20   Ht 170.2 cm (67\")   Wt 85.3 kg (188 lb)   SpO2 98%   BMI 29.44 kg/m²     Physical Exam  Vitals and nursing note reviewed.   Constitutional:       General: She is not in acute distress.     Appearance: She is well-developed.   HENT:      Head: Normocephalic and atraumatic.      Right Ear: Tympanic membrane and external ear normal.      Left Ear: Tympanic membrane and external ear normal.      Nose: Nose normal.      Mouth/Throat:      Pharynx: No oropharyngeal exudate.   Eyes:      General: No scleral icterus.        Right eye: No discharge.         Left eye: No discharge.      Conjunctiva/sclera: Conjunctivae normal.      Pupils: Pupils are equal, round, and reactive to light.   Neck:      Thyroid: No thyromegaly.      Trachea: No tracheal deviation.   Cardiovascular:      Rate and Rhythm: Normal rate and regular rhythm.      Heart sounds: Normal heart sounds. No murmur. No friction rub. No gallop.    Pulmonary:      Effort: Pulmonary effort is normal. No respiratory distress.      Breath sounds: Normal breath sounds. No wheezing.   Abdominal:      General: Bowel sounds are normal. There is no distension.      Palpations: Abdomen is soft. There is no mass.      Tenderness: There is no abdominal tenderness. "   Musculoskeletal:         General: No deformity.      Cervical back: Neck supple.   Lymphadenopathy:      Cervical: No cervical adenopathy.   Skin:     General: Skin is warm and dry.      Capillary Refill: Capillary refill takes less than 2 seconds.      Findings: No erythema or rash.   Neurological:      Mental Status: She is alert and oriented to person, place, and time.      Coordination: Coordination normal.   Psychiatric:         Speech: Speech normal.         Behavior: Behavior normal.         Thought Content: Thought content normal.         Judgment: Judgment normal.        Result Review :            POCT rapid strep A (03/15/2021 13:38)  POCT Influenza A/B (03/15/2021 13:38)       Assessment and Plan    Diagnoses and all orders for this visit:    1. Sore throat (Primary)  -     POCT Influenza A/B  -     POCT rapid strep A  -     COVID-19 PCR, [facility], NP SWAB IN [facility] VIRAL TRANSPORT MEDIA 24-30 HR TAT - Swab, Nasopharynx    2. Cough  -     POCT Influenza A/B  -     COVID-19 PCR, [facility], NP SWAB IN [facility] VIRAL TRANSPORT MEDIA 24-30 HR TAT - Swab, Nasopharynx  -     oseltamivir (Tamiflu) 75 MG capsule; Take 1 capsule by mouth Daily.  Dispense: 10 capsule; Refill: 0  -     albuterol sulfate  (90 Base) MCG/ACT inhaler; Inhale 2 puffs Every 4 (Four) Hours As Needed for Wheezing.  Dispense: 18 g; Refill: 1    3. Congestion of nasal sinus  -     POCT Influenza A/B  -     COVID-19 PCR, [facility], NP SWAB IN [facility] VIRAL TRANSPORT MEDIA 24-30 HR TAT - Swab, Nasopharynx    4. Exposure to the flu  -     oseltamivir (Tamiflu) 75 MG capsule; Take 1 capsule by mouth Daily.  Dispense: 10 capsule; Refill: 0  -     albuterol sulfate  (90 Base) MCG/ACT inhaler; Inhale 2 puffs Every 4 (Four) Hours As Needed for Wheezing.  Dispense: 18 g; Refill: 1    Since daughter had confirmed Flu B will go ahead and treat. Hydrate, use albuterol 2puff qid prn. Will await covid results. Follow up symptoms persist or  worsen.      Follow Up   Patient was given instructions and counseling regarding her condition or for health maintenance advice. Please see specific information pulled into the AVS if appropriate.

## 2021-03-16 ENCOUNTER — TELEPHONE (OUTPATIENT)
Dept: FAMILY MEDICINE CLINIC | Facility: CLINIC | Age: 26
End: 2021-03-16

## 2021-03-16 LAB — SARS-COV-2 RNA NOSE QL NAA+PROBE: NOT DETECTED

## 2021-04-09 ENCOUNTER — OFFICE VISIT (OUTPATIENT)
Dept: FAMILY MEDICINE CLINIC | Facility: CLINIC | Age: 26
End: 2021-04-09

## 2021-04-09 VITALS
RESPIRATION RATE: 20 BRPM | TEMPERATURE: 99.1 F | WEIGHT: 188 LBS | DIASTOLIC BLOOD PRESSURE: 88 MMHG | BODY MASS INDEX: 29.51 KG/M2 | SYSTOLIC BLOOD PRESSURE: 130 MMHG | OXYGEN SATURATION: 98 % | HEIGHT: 67 IN | HEART RATE: 110 BPM

## 2021-04-09 DIAGNOSIS — J06.9 UPPER RESPIRATORY TRACT INFECTION, UNSPECIFIED TYPE: Primary | ICD-10-CM

## 2021-04-09 PROCEDURE — 99214 OFFICE O/P EST MOD 30 MIN: CPT | Performed by: NURSE PRACTITIONER

## 2021-04-09 PROCEDURE — 87880 STREP A ASSAY W/OPTIC: CPT | Performed by: NURSE PRACTITIONER

## 2021-04-09 PROCEDURE — U0004 COV-19 TEST NON-CDC HGH THRU: HCPCS | Performed by: NURSE PRACTITIONER

## 2021-04-09 PROCEDURE — 87804 INFLUENZA ASSAY W/OPTIC: CPT | Performed by: NURSE PRACTITIONER

## 2021-04-09 RX ORDER — AZITHROMYCIN 250 MG/1
TABLET, FILM COATED ORAL
Qty: 6 TABLET | Refills: 0 | Status: SHIPPED | OUTPATIENT
Start: 2021-04-09 | End: 2021-04-14

## 2021-04-09 RX ORDER — FLUCONAZOLE 150 MG/1
150 TABLET ORAL DAILY
Qty: 2 TABLET | Refills: 0 | Status: SHIPPED | OUTPATIENT
Start: 2021-04-09 | End: 2021-05-10 | Stop reason: ALTCHOICE

## 2021-04-10 PROBLEM — J02.0 STREP PHARYNGITIS: Status: RESOLVED | Noted: 2021-03-03 | Resolved: 2021-04-10

## 2021-04-10 PROBLEM — J02.9 SORE THROAT: Status: RESOLVED | Noted: 2021-03-03 | Resolved: 2021-04-10

## 2021-04-10 PROBLEM — R19.7 DIARRHEA: Status: RESOLVED | Noted: 2020-04-13 | Resolved: 2021-04-10

## 2021-04-10 PROBLEM — H66.93 BILATERAL OTITIS MEDIA: Status: RESOLVED | Noted: 2021-03-03 | Resolved: 2021-04-10

## 2021-04-10 PROBLEM — R11.0 NAUSEA: Status: RESOLVED | Noted: 2020-04-13 | Resolved: 2021-04-10

## 2021-04-10 LAB
EXPIRATION DATE: NORMAL
EXPIRATION DATE: NORMAL
FLUAV AG NPH QL: NEGATIVE
FLUBV AG NPH QL: NEGATIVE
INTERNAL CONTROL: NORMAL
INTERNAL CONTROL: NORMAL
Lab: 2780
Lab: NORMAL
S PYO AG THROAT QL: NEGATIVE
SARS-COV-2 RNA NOSE QL NAA+PROBE: NOT DETECTED

## 2021-04-10 NOTE — PROGRESS NOTES
Follow Up Office Note     Patient Name: Yoly Cam  : 1995   MRN: [id number]     Chief Complaint:    Chief Complaint   Patient presents with   • Cough   • Nasal Congestion       History of Present Illness: Yoly Cam is a 25 y.o. female who presents today with c/o URI symptoms x several days. She denies fever, loss of taste or smell, shortness of breath. Patient's 24 month daughter has been ill as well and had to be taken to ER. Patient is requesting testing for strep, flu and Covid-19 today. 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    URI   This is a new problem. The current episode started in the past 7 days. The problem has been gradually worsening. There has been no fever. Associated symptoms include congestion, coughing and a sore throat. Pertinent negatives include no abdominal pain, chest pain, diarrhea, dysuria, nausea, rash, vomiting or wheezing.   Cough  This is a new problem. The current episode started yesterday. The problem has been waxing and waning. The problem occurs every few minutes. The cough is non-productive. Associated symptoms include ear congestion, nasal congestion, postnasal drip and a sore throat. Pertinent negatives include no chest pain, chills, fever, hemoptysis, myalgias, rash, shortness of breath, sweats or wheezing. Treatments tried: DayQuil. The treatment provided mild relief.        Subjective      Review of Systems:   Review of Systems   Constitutional: Positive for fatigue. Negative for appetite change, chills, diaphoresis and fever.   HENT: Positive for congestion, postnasal drip, sinus pressure and sore throat. Negative for ear discharge, facial swelling, trouble swallowing and voice change.    Respiratory: Positive for cough. Negative for hemoptysis, choking, chest tightness, shortness of breath, wheezing and stridor.    Cardiovascular: Negative.  Negative for  "chest pain.   Gastrointestinal: Negative.  Negative for abdominal pain, diarrhea, nausea and vomiting.   Genitourinary: Negative for dysuria.   Musculoskeletal: Negative for myalgias.   Skin: Negative for rash.   Neurological: Negative.         Past Medical History:   Past Medical History:   Diagnosis Date   • PCOS (polycystic ovarian syndrome)    • PFO (patent foramen ovale)          Medications:     Current Outpatient Medications:   •  albuterol sulfate  (90 Base) MCG/ACT inhaler, Inhale 2 puffs Every 4 (Four) Hours As Needed for Wheezing., Disp: 18 g, Rfl: 1  •  azithromycin (ZITHROMAX) 250 MG tablet, Take 2 tablets the first day, then 1 tablet daily for 4 days., Disp: 6 tablet, Rfl: 0  •  fluconazole (Diflucan) 150 MG tablet, Take 1 tablet by mouth Daily., Disp: 2 tablet, Rfl: 0    Allergies:   Allergies   Allergen Reactions   • Ceclor [Cefaclor] Hives         Objective     Physical Exam:  Vital Signs:   Vitals:    04/09/21 1358   BP: 130/88   BP Location: Left arm   Patient Position: Sitting   Cuff Size: Adult   Pulse: 110   Resp: 20   Temp: 99.1 °F (37.3 °C)   SpO2: 98%   Weight: 85.3 kg (188 lb)   Height: 170.2 cm (67\")   PainSc: 0-No pain     Body mass index is 29.44 kg/m².     Physical Exam  Vitals and nursing note reviewed.   Constitutional:       General: She is not in acute distress.     Appearance: Normal appearance. She is well-developed and well-groomed. She is not ill-appearing, toxic-appearing or diaphoretic.   HENT:      Head: Normocephalic and atraumatic.      Right Ear: External ear normal. A middle ear effusion is present. Tympanic membrane is injected and bulging.      Left Ear: External ear normal. A middle ear effusion is present. Tympanic membrane is injected and bulging.      Nose: Mucosal edema and congestion present.      Mouth/Throat:      Lips: Pink.      Mouth: Mucous membranes are moist.      Pharynx: Posterior oropharyngeal erythema present. No oropharyngeal exudate. "   Cardiovascular:      Rate and Rhythm: Normal rate and regular rhythm.      Heart sounds: Normal heart sounds.   Pulmonary:      Effort: Pulmonary effort is normal. No respiratory distress.      Breath sounds: Normal breath sounds. No stridor. No wheezing.   Musculoskeletal:      Cervical back: Normal range of motion and neck supple. No rigidity or tenderness.   Lymphadenopathy:      Cervical: No cervical adenopathy.   Skin:     General: Skin is warm and dry.   Neurological:      General: No focal deficit present.      Mental Status: She is alert and oriented to person, place, and time.   Psychiatric:         Mood and Affect: Mood normal.         Behavior: Behavior normal. Behavior is cooperative.         Thought Content: Thought content normal.         Judgment: Judgment normal.         Assessment / Plan      Assessment/Plan:   Diagnoses and all orders for this visit:    1. Upper respiratory tract infection, unspecified type (Primary)  -     azithromycin (ZITHROMAX) 250 MG tablet; Take 2 tablets the first day, then 1 tablet daily for 4 days.  Dispense: 6 tablet; Refill: 0  -     fluconazole (Diflucan) 150 MG tablet; Take 1 tablet by mouth Daily.  Dispense: 2 tablet; Refill: 0  -     COVID-19 PCR, [facility], NP SWAB IN [facility] VIRAL TRANSPORT MEDIA 24-30 HR TAT - Swab, Nasopharynx  -     POC Rapid Strep A  -     POC Influenza A / B  -     [facility] SERIES       Lab Results   Component Value Date    RAPFLUA Negative 04/10/2021    RAPFLUB Negative 04/10/2021     Lab Results   Component Value Date    RAPSCRN Negative 04/10/2021       CURRENT COVID RISKS:  [] Fever [x] Cough [] Shortness of breath [] Loss of taste or smell    [x] Exposure to COVID positive patient  [] High risk facility   []  NONE       Follow Up:   PRN and at next scheduled appointment(s) with PCP.    Discussed the nature of the medical condition(s) risks, complications, implications, management, safe and proper use of medications. Encouraged  medication compliance, and keeping scheduled follow up appointments with me and any other providers.      I spent 30 minutes caring for Yoly on this date of service. This time includes time spent by me in the following activities:preparing for the visit, reviewing tests, performing a medically appropriate examination and/or evaluation , counseling and educating the patient/family/caregiver, ordering medications, tests, or procedures and documenting information in the medical record.    *Quarantine at home per CDC guidelines pending Covid-19 testing results.    RTC if symptoms fail to improve, to ER if symptoms worsen.      MILA Ayala  [facility] [place]       Please note that portions of this note may have been completed with a voice recognition program. Efforts were made to edit the dictations, but occasionally words are mistranscribed.

## 2021-04-10 NOTE — PATIENT INSTRUCTIONS
Upper Respiratory Infection, Adult  An upper respiratory infection (URI) is a common viral infection of the nose, throat, and upper air passages that lead to the lungs. The most common type of URI is the common cold. URIs usually get better on their own, without medical treatment.  What are the causes?  A URI is caused by a virus. You may catch a virus by:  · Breathing in droplets from an infected person's cough or sneeze.  · Touching something that has been exposed to the virus (contaminated) and then touching your mouth, nose, or eyes.  What increases the risk?  You are more likely to get a URI if:  · You are very young or very old.  · It is jesus or winter.  · You have close contact with others, such as at a , school, or health care facility.  · You smoke.  · You have long-term (chronic) heart or lung disease.  · You have a weakened disease-fighting (immune) system.  · You have nasal allergies or asthma.  · You are experiencing a lot of stress.  · You work in an area that has poor air circulation.  · You have poor nutrition.  What are the signs or symptoms?  A URI usually involves some of the following symptoms:  · Runny or stuffy (congested) nose.  · Sneezing.  · Cough.  · Sore throat.  · Headache.  · Fatigue.  · Fever.  · Loss of appetite.  · Pain in your forehead, behind your eyes, and over your cheekbones (sinus pain).  · Muscle aches.  · Redness or irritation of the eyes.  · Pressure in the ears or face.  How is this diagnosed?  This condition may be diagnosed based on your medical history and symptoms, and a physical exam. Your health care provider may use a cotton swab to take a mucus sample from your nose (nasal swab). This sample can be tested to determine what virus is causing the illness.  How is this treated?  URIs usually get better on their own within 7-10 days. You can take steps at home to relieve your symptoms. Medicines cannot cure URIs, but your health care provider may recommend  certain medicines to help relieve symptoms, such as:  · Over-the-counter cold medicines.  · Cough suppressants. Coughing is a type of defense against infection that helps to clear the respiratory system, so take these medicines only as recommended by your health care provider.  · Fever-reducing medicines.  Follow these instructions at home:  Activity  · Rest as needed.  · If you have a fever, stay home from work or school until your fever is gone or until your health care provider says you are no longer contagious. Your health care provider may have you wear a face mask to prevent your infection from spreading.  Relieving symptoms  · Gargle with a salt-water mixture 3-4 times a day or as needed. To make a salt-water mixture, completely dissolve ½-1 tsp of salt in 1 cup of warm water.  · Use a cool-mist humidifier to add moisture to the air. This can help you breathe more easily.  Eating and drinking    · Drink enough fluid to keep your urine pale yellow.  · Eat soups and other clear broths.  General instructions    · Take over-the-counter and prescription medicines only as told by your health care provider. These include cold medicines, fever reducers, and cough suppressants.  · Do not use any products that contain nicotine or tobacco, such as cigarettes and e-cigarettes. If you need help quitting, ask your health care provider.  · Stay away from secondhand smoke.  · Stay up to date on all immunizations, including the yearly (annual) flu vaccine.  · Keep all follow-up visits as told by your health care provider. This is important.  How to prevent the spread of infection to others    · URIs can be passed from person to person (are contagious). To prevent the infection from spreading:  ? Wash your hands often with soap and water. If soap and water are not available, use hand .  ? Avoid touching your mouth, face, eyes, or nose.  ? Cough or sneeze into a tissue or your sleeve or elbow instead of into your hand  or into the air.  Contact a health care provider if:  · You are getting worse instead of better.  · You have a fever or chills.  · Your mucus is brown or red.  · You have yellow or brown discharge coming from your nose.  · You have pain in your face, especially when you bend forward.  · You have swollen neck glands.  · You have pain while swallowing.  · You have white areas in the back of your throat.  Get help right away if:  · You have shortness of breath that gets worse.  · You have severe or persistent:  ? Headache.  ? Ear pain.  ? Sinus pain.  ? Chest pain.  · You have chronic lung disease along with any of the following:  ? Wheezing.  ? Prolonged cough.  ? Coughing up blood.  ? A change in your usual mucus.  · You have a stiff neck.  · You have changes in your:  ? Vision.  ? Hearing.  ? Thinking.  ? Mood.  Summary  · An upper respiratory infection (URI) is a common infection of the nose, throat, and upper air passages that lead to the lungs.  · A URI is caused by a virus.  · URIs usually get better on their own within 7-10 days.  · Medicines cannot cure URIs, but your health care provider may recommend certain medicines to help relieve symptoms.  This information is not intended to replace advice given to you by your health care provider. Make sure you discuss any questions you have with your health care provider.  Document Revised: 12/26/2019 Document Reviewed: 08/03/2018  [employer] © 2021 [employer]  How to Quarantine at Home  Information for Patients and Families    These instructions are for people with confirmed or suspected COVID-19 who do not need to be hospitalized and those with confirmed COVID-19 who were hospitalized and discharged to care for themselves at home.    If you were tested through the Health Department  The Health Department will monitor your wellbeing.  If it is determined that you do not need to be hospitalized and can be isolated at home, you will be monitored by  staff from your local or state health department.     If you were tested through a [facility]  You will need to monitor yourself and report changes in your symptoms to your doctor.  See the section below called Monitor Your Symptoms.    Follow these steps until a healthcare provider or [facility] or [facility] health department says you can return to your normal activities.    Stay home except to get medical care  • Restrict activities outside your home, except for getting medical care.   • Do not go to work, school, or public areas.   • Avoid using public transportation, ride-sharing, or taxis.    Separate yourself from other people and animals in your home  People  As much as possible, you should stay in a specific room and away from other people in your home. Also, you should use a separate bathroom, if available.    Animals  You should restrict contact with pets and other animals while you are sick with COVID-19, just like you would around other people. When possible, have another member of your household care for your animals while you are sick. If you are sick with COVID-19, avoid contact with your pet, including petting, snuggling, being kissed or licked, and sharing food. If you must care for your pet or be around animals while you are sick, wash your hands before and after you interact with pets and wear a facemask. See COVID-19 and Animals for more information.    Call ahead before visiting your doctor  If you have a medical appointment, call the healthcare provider and tell them that you have or may have COVID-19. This information will help the healthcare provider’s office take steps to keep other people from getting infected or exposed.    Wear a facemask  You should wear a facemask when you are around other people (e.g., sharing a room or vehicle) or pets and before you enter a healthcare provider’s office.     If you are not able to wear a facemask (for example, because it causes trouble breathing), then  people who live with you should not stay in the same room with you, or they should wear a facemask if they enter your room.    Cover your coughs and sneezes  • Cover your mouth and nose with a tissue when you cough or sneeze.   • Throw used tissues in a lined trash can.   • Immediately wash your hands with soap and water for at least 20 seconds or, if soap and water are not available, clean your hands with an alcohol-based hand  that contains at least 60% alcohol.    Clean your hands often  • Wash your hands often with soap and water for at least 20 seconds, especially after blowing your nose, coughing, or sneezing; going to the bathroom; and before eating or preparing food.     • If soap and water are not readily available, use an alcohol-based hand  with at least 60% alcohol, covering all surfaces of your hands and rubbing them together until they feel dry.    • Soap and water are the best option if hands are visibly dirty. Avoid touching your eyes, nose, and mouth with unwashed hands.    Avoid sharing personal household items  • You should not share dishes, drinking glasses, cups, eating utensils, towels, or bedding with other people or pets in your home.   • After using these items, they should be washed thoroughly with soap and water.    Clean all “high-touch” surfaces everyday  • High touch surfaces include counters, tabletops, doorknobs, bathroom fixtures, toilets, phones, keyboards, tablets, and bedside tables.   • Also, clean any surfaces that may have blood, stool, or body fluids on them.   • Use a household cleaning spray or wipe, according to the label instructions. Labels contain instructions for safe and effective use of the cleaning product, including precautions you should take when applying the product, such as wearing gloves and making sure you have good ventilation during use of the product.    Monitor your symptoms  • Seek prompt medical attention if your illness is worsening  (e.g., difficulty breathing).   • Before seeking care, call your healthcare provider and tell them that you have, or are being evaluated for, COVID-19.   • Put on a facemask before you enter the facility.     • These steps will help the healthcare provider’s office to keep other people in the office or waiting room from getting infected or exposed.   • Persons who are placed under active monitoring or facilitated self-monitoring should follow instructions provided by their local health department or occupational health professionals, as appropriate.  • If you have a medical emergency and need to call 911, notify the dispatch personnel that you have, or are being evaluated for COVID-19. If possible, put on a facemask before emergency medical services arrive.    Discontinuing home isolation  Patients with confirmed COVID-19 should remain under home isolation precautions until the risk of secondary transmission to others is thought to be low. The decision to discontinue home isolation precautions should be made on a case-by-case basis, in consultation with healthcare providers and state and local health departments.    The below content are for household members, intimate partners, and caregivers of a patient with symptomatic laboratory-confirmed COVID-19 or a patient under investigation:    Household members, intimate partners, and caregivers may have close contact with a person with symptomatic, laboratory-confirmed COVID-19 or a person under investigation.     Close contacts should monitor their health; they should call their healthcare provider right away if they develop symptoms suggestive of COVID-19 (e.g., fever, cough, shortness of breath)     Close contacts should also follow these recommendations:  • Make sure that you understand and can help the patient follow their healthcare provider’s instructions for medication(s) and care. You should help the patient with basic needs in the home and provide support for  getting groceries, prescriptions, and other personal needs.  • Monitor the patient’s symptoms. If the patient is getting sicker, call his or her healthcare provider and tell them that the patient has laboratory-confirmed COVID-19. This will help the healthcare provider’s office take steps to keep other people in the office or waiting room from getting infected. Ask the healthcare provider to call the local or [facility] health department for additional guidance. If the patient has a medical emergency and you need to call 911, notify the dispatch personnel that the patient has, or is being evaluated for COVID-19.  • Household members should stay in another room or be  from the patient as much as possible. Household members should use a separate bedroom and bathroom, if available.  • Prohibit visitors who do not have an essential need to be in the home.  • Household members should care for any pets in the home. Do not handle pets or other animals while sick.  For more information, see COVID-19 and Animals.  • Make sure that shared spaces in the home have good air flow, such as by an air conditioner or an opened window, weather permitting.  • Perform hand hygiene frequently. Wash your hands often with soap and water for at least 20 seconds or use an alcohol-based hand  that contains 60 to 95% alcohol, covering all surfaces of your hands and rubbing them together until they feel dry. Soap and water should be used preferentially if hands are visibly dirty.  • Avoid touching your eyes, nose, and mouth with unwashed hands.  • The patient should wear a facemask when you are around other people. If the patient is not able to wear a facemask (for example, because it causes trouble breathing), you, as the caregiver, should wear a mask when you are in the same room as the patient.  • Wear a disposable facemask and gloves when you touch or have contact with the patient’s blood, stool, or body fluids, such as saliva,  sputum, nasal mucus, vomit, or urine.   o Throw out disposable facemasks and gloves after using them. Do not reuse.  o When removing personal protective equipment, first remove and dispose of gloves. Then, immediately clean your hands with soap and water or alcohol-based hand . Next, remove and dispose of facemask, and immediately clean your hands again with soap and water or alcohol-based hand .  • Avoid sharing household items with the patient. You should not share dishes, drinking glasses, cups, eating utensils, towels, bedding, or other items. After the patient uses these items, you should wash them thoroughly (see below “Wash laundry thoroughly”).  • Clean all “high-touch” surfaces, such as counters, tabletops, doorknobs, bathroom fixtures, toilets, phones, keyboards, tablets, and bedside tables, every day. Also, clean any surfaces that may have blood, stool, or body fluids on them.   o Use a household cleaning spray or wipe, according to the label instructions. Labels contain instructions for safe and effective use of the cleaning product including precautions you should take when applying the product, such as wearing gloves and making sure you have good ventilation during use of the product.  • Wash laundry thoroughly.   o Immediately remove and wash clothes or bedding that have blood, stool, or body fluids on them.  o Wear disposable gloves while handling soiled items and keep soiled items away from your body. Clean your hands (with soap and water or an alcohol-based hand ) immediately after removing your gloves.  o Read and follow directions on labels of laundry or clothing items and detergent. In general, using a normal laundry detergent according to washing machine instructions and dry thoroughly using the warmest temperatures recommended on the clothing label.  • Place all used disposable gloves, facemasks, and other contaminated items in a lined container before disposing of  them with other household waste. Clean your hands (with soap and water or an alcohol-based hand ) immediately after handling these items. Soap and water should be used preferentially if hands are visibly dirty.  • Discuss any additional questions with your state or local health department or healthcare provider.    Adapted from information provided by the Centers for Disease Control and Prevention.  For more information, visit https://www.cdc.gov/coronavirus/2019-ncov/hcp/guidance-prevent-spread.html  COVID-19  COVID-19 is a respiratory infection that is caused by a virus called severe acute respiratory syndrome coronavirus 2 (SARS-CoV-2). The disease is also known as coronavirus disease or novel coronavirus. In some people, the virus may not cause any symptoms. In others, it may cause a serious infection. The infection can get worse quickly and can lead to complications, such as:  · Pneumonia, or infection of the lungs.  · Acute respiratory distress syndrome, or ARDS. This is a condition in which fluid buildup in the lungs prevents the lungs from filling with air and passing oxygen into the blood.  · Acute respiratory failure. This is a condition in which there is not enough oxygen passing from the lungs to the body or when carbon dioxide is not passing from the lungs out of the body.  · Sepsis or septic shock. This is a serious bodily reaction to an infection.  · Blood-clotting problems.  · Secondary infections due to bacteria or fungus.  · Organ failure. This is when your body's organs stop working.  The virus that causes COVID-19 is contagious. This means that it can spread from person to person through droplets from coughs and sneezes (respiratory secretions).  What are the causes?  This illness is caused by a virus. You may catch the virus by:  · Breathing in droplets from an infected person. Droplets can be spread by a person breathing, speaking, singing, coughing, or sneezing.  · Touching something,  "like a table or a doorknob, that was exposed to the virus (contaminated) and then touching your mouth, nose, or eyes.  What increases the risk?  Risk for infection  You are more likely to be infected with this virus if you:  · Are within 6 ft (2 m) of a person with COVID-19.  · Provide care for or live with a person who is infected with COVID-19.  · Spend time in crowded indoor spaces or live in shared housing.  Risk for serious illness  You are more likely to become seriously ill from the virus if you:  · Are 50 years of age or older. The higher your age, the more you are at risk for serious illness.  · Live in a nursing home or long-term care facility.  · Have cancer.  · Have a long-term (chronic) disease such as:  ? Chronic lung disease, including chronic obstructive pulmonary disease or asthma.  ? A long-term disease that lowers your body's ability to fight infection (immunocompromised).  ? Heart disease, including:  § Heart failure.  § Coronary artery disease, a condition in which the arteries that lead to the heart become narrow or blocked.  § A disease that makes the heart muscle thick, weak, or stiff (cardiomyopathy).  ? Diabetes.  ? Chronic kidney disease.  ? Sickle cell disease, a condition in which red blood cells have an abnormal \"sickle\" shape.  ? Liver disease.  · Are obese.  What are the signs or symptoms?  Symptoms of this condition can range from mild to severe. Symptoms may appear any time from 2 to 14 days after being exposed to the virus. They include:  · A fever or chills.  · A cough.  · Difficulty breathing or feeling short of breath.  · Feeling tired.  · Headaches, body aches, or muscle aches.  · Runny or stuffy (congested) nose.  · A sore throat.  · New loss of taste or smell.  Some people may also have stomach problems, such as nausea, vomiting, or diarrhea.  Other people may not have any symptoms of COVID-19.  How is this diagnosed?  This condition may be diagnosed based on:  · Your signs " and symptoms, especially if:  ? You provide care for or live with a person who was diagnosed with COVID-19.  ? You were exposed to a person who was diagnosed with COVID-19.  · A physical exam.  · Lab tests, which may include:  ? Using a swab to take a sample of fluid from the back of your nose and throat (nasopharyngeal fluid), from your nose, or from your throat.  ? Testing a sample of saliva from your mouth.  ? Testing a sample of coughed-up mucus from your lungs (sputum).  ? Blood tests.  · Imaging tests, which may include X-rays, CT scan, or ultrasound.  How is this treated?  Your health care provider will talk with you about ways to treat your symptoms. For most people, the infection is mild and can be managed at home with rest, fluids, and over-the-counter medicines. There are currently no prescription medicines that have been approved for treatment of people with mild cases of COVID-19. Some medicines that treat other diseases are being used on a trial basis to see if they are effective for treating mild cases of COVID-19.  Treatment for a serious infection usually takes place in a hospital intensive care unit (ICU). It may include one or more of the following treatments. These treatments are given until your symptoms improve.  · Receiving fluids and medicines through an IV.  ? Some medicines have been approved for the treatment of people with serious infection who are being treated in the hospital. In addition, certain medicines that treat other diseases are being used on a trial basis to see if they are effective for treating severe cases of COVID-19.  · Supplemental oxygen. Extra oxygen is given through a tube in the nose, a face mask, or a shanks.  · Positioning you to lie on your stomach (prone position). This makes it easier for oxygen to get into the lungs.  · Continuous positive airway pressure (CPAP) or bi-level positive airway pressure (BPAP) machine. This treatment uses mild air pressure to keep the  airways open. A tube that is connected to a motor delivers oxygen to the body.  · Ventilator. This treatment moves air into and out of the lungs by using a tube that is placed in your windpipe.  · Tracheostomy. This is a procedure to create a hole in the neck so that a breathing tube can be inserted.  · Extracorporeal membrane oxygenation (ECMO). This procedure gives the lungs a chance to recover by taking over the functions of the heart and lungs. It supplies oxygen to the body and removes carbon dioxide.  Follow these instructions at home:  Lifestyle  · If you are sick, stay home except to get medical care. Your health care provider will tell you how long to stay home. Call your health care provider before you go for medical care.  · Rest at home as told by your health care provider.  · Do not use any products that contain nicotine or tobacco, such as cigarettes, e-cigarettes, and chewing tobacco. If you need help quitting, ask your health care provider.  · Return to your normal activities as told by your health care provider. Ask your health care provider what activities are safe for you.  General instructions  · Take over-the-counter and prescription medicines only as told by your health care provider.  · Drink enough fluid to keep your urine pale yellow.  · Keep all follow-up visits as told by your health care provider. This is important.  How is this prevented?         Some vaccines to prevent the virus that causes COVID-19 have been authorized for emergency use. This means that the vaccines are still being tested, but they have been deemed safe for use and have been effective in preventing COVID-19 in trials. These vaccines may not be available to everyone right away. The vaccines will likely be given to certain groups at higher risk first until there is enough supply available for everyone. Until the vaccines are available for everyone, it is important to continue to take steps to protect yourself and others  from this virus.  To protect yourself:   Take precautions to avoid infection.  · Stay away from people who are sick.  · Wash your hands often with soap and water for at least 20 seconds. If soap and water are not available, use an alcohol-based hand .  · Avoid touching your mouth, face, eyes, or nose.  · Avoid going out in public. Follow guidance from your state and local health authorities.  · If you must go out in public, wear a cloth face covering or face mask. Make sure your mask covers your nose and mouth.  · Avoid crowded indoor spaces. Stay at least 6 ft (2 m) away from others.  · Disinfect objects and surfaces that are frequently touched every day. This may include:  ? Counters and tables.  ? Doorknobs and light switches.  ? Sinks and faucets.  ? Electronics such as phones, remote controls, keyboards, computers, and tablets.  To protect others:  If you have symptoms of COVID-19, take steps to prevent the virus from spreading to others.  · If you think you have a COVID-19 infection, contact your health care provider right away. Tell your health care team that you think you may have a COVID-19 infection.  · Stay home. Leave your house only to seek medical care. Do not use public transport, if possible.  · Do not travel while you are sick.  · Wash your hands often with soap and water for at least 20 seconds. If soap and water are not available, use alcohol-based hand .  · Stay away from other members of your household. Let healthy household members care for children and pets, if possible. If you have to care for children or pets, wash your hands often and wear a mask. If possible, stay in your own room, separate from others. Use a different bathroom.  · Make sure that all people in your household wash their hands well and often.  · Cough or sneeze into a tissue or your sleeve or elbow. Do not cough or sneeze into your hand or into the air.  · Wear a cloth face covering or face mask. Make sure  your mask covers your nose and mouth.  Where to find more information  · Centers for Disease Control and Prevention: www.cdc.gov/coronavirus  · World Health Organization: www.who.int/health-topics/coronavirus  Contact a health care provider if:  · You have symptoms of COVID-19.  · You have been exposed to someone who has COVID-19, even if you do not have symptoms.  Get help right away if:  · You have trouble breathing.  · You have pain or pressure in your chest.  · You have confusion.  · You have bluish lips and fingernails.  · You have difficulty waking from sleep.  · You have symptoms that get worse.  These symptoms may represent a serious problem that is an emergency. Do not wait to see if the symptoms will go away. Get medical help right away. Call your local emergency services (911 in the [place] Do not drive yourself to the hospital. Let the emergency medical personnel know if you think you have COVID-19.  Summary  · COVID-19 is a respiratory infection that is caused by a virus. It is also known as coronavirus disease or novel coronavirus. It can cause serious infections, such as pneumonia, acute respiratory distress syndrome, acute respiratory failure, or sepsis.  · The virus that causes COVID-19 is contagious. This means that it can spread from person to person through droplets from breathing, speaking, singing, coughing, or sneezing.  · You are more likely to develop a serious illness if you are 50 years of age or older, have a weak immune system, live in a nursing home, or have a chronic disease.  · There is no approved medicine to treat mild cases of COVID-19. Your health care provider will talk with you about ways to treat your symptoms.  · Take steps to protect yourself and others from infection. Wash your hands often and disinfect objects and surfaces that are frequently touched every day. Stay away from people who are sick, and wear a mask if you are sick.  This information is not intended to replace  advice given to you by your health care provider. Make sure you discuss any questions you have with your health care provider.  Document Revised: 12/14/2020 Document Reviewed: 12/15/2020  [employer] Patient Education © 2021 [employer]  Azithromycin tablets  What is this medicine?  AZITHROMYCIN (az ith marilyn LEYVA sin) is a macrolide antibiotic. It is used to treat or prevent certain kinds of bacterial infections. It will not work for colds, flu, or other viral infections.  This medicine may be used for other purposes; ask your health care provider or pharmacist if you have questions.  COMMON BRAND NAME(S): Zithromax, Zithromax Tri-Lorenzo, Zithromax Z-Lorenzo  What should I tell my health care provider before I take this medicine?  They need to know if you have any of these conditions:  · history of blood diseases, like leukemia  · history of irregular heartbeat  · kidney disease  · liver disease  · myasthenia gravis  · an unusual or allergic reaction to azithromycin, erythromycin, other macrolide antibiotics, foods, dyes, or preservatives  · pregnant or trying to get pregnant  · breast-feeding  How should I use this medicine?  Take this medicine by mouth with a full glass of water. Follow the directions on the prescription label. The tablets can be taken with food or on an empty stomach. If the medicine upsets your stomach, take it with food. Take your medicine at regular intervals. Do not take your medicine more often than directed. Take all of your medicine as directed even if you think your are better. Do not skip doses or stop your medicine early.  Talk to your pediatrician regarding the use of this medicine in children. While this drug may be prescribed for children as young as 6 months for selected conditions, precautions do apply.  Overdosage: If you think you have taken too much of this medicine contact a poison control center or emergency room at once.  NOTE: This medicine is only for you. Do not share this medicine  with others.  What if I miss a dose?  If you miss a dose, take it as soon as you can. If it is almost time for your next dose, take only that dose. Do not take double or extra doses.  What may interact with this medicine?  Do not take this medicine with any of the following medications:  · cisapride  · dronedarone  · pimozide  · thioridazine  This medicine may also interact with the following medications:  · antacids that contain aluminum or magnesium  · birth control pills  · colchicine  · cyclosporine  · digoxin  · ergot alkaloids like dihydroergotamine, ergotamine  · nelfinavir  · other medicines that prolong the QT interval (an abnormal heart rhythm)  · phenytoin  · warfarin  This list may not describe all possible interactions. Give your health care provider a list of all the medicines, herbs, non-prescription drugs, or dietary supplements you use. Also tell them if you smoke, drink alcohol, or use illegal drugs. Some items may interact with your medicine.  What should I watch for while using this medicine?  Tell your doctor or healthcare provider if your symptoms do not start to get better or if they get worse.  This medicine may cause serious skin reactions. They can happen weeks to months after starting the medicine. Contact your healthcare provider right away if you notice fevers or flu-like symptoms with a rash. The rash may be red or purple and then turn into blisters or peeling of the skin. Or, you might notice a red rash with swelling of the face, lips or lymph nodes in your neck or under your arms.  Do not treat diarrhea with over the counter products. Contact your doctor if you have diarrhea that lasts more than 2 days or if it is severe and watery.  This medicine can make you more sensitive to the sun. Keep out of the sun. If you cannot avoid being in the sun, wear protective clothing and use sunscreen. Do not use sun lamps or tanning beds/booths.  What side effects may I notice from receiving this  medicine?  Side effects that you should report to your doctor or health care professional as soon as possible:  · allergic reactions like skin rash, itching or hives, swelling of the face, lips, or tongue  · bloody or watery diarrhea  · breathing problems  · chest pain  · fast, irregular heartbeat  · muscle weakness  · rash, fever, and swollen lymph nodes  · redness, blistering, peeling, or loosening of the skin, including inside the mouth  · signs and symptoms of liver injury like dark yellow or brown urine; general ill feeling or flu-like symptoms; light-colored stools; loss of appetite; nausea; right upper belly pain; unusually weak or tired; yellowing of the eyes or skin  · white patches or sores in the mouth  · unusually weak or tired  Side effects that usually do not require medical attention (report to your doctor or health care professional if they continue or are bothersome):  · diarrhea  · nausea  · stomach pain  · vomiting  This list may not describe all possible side effects. Call your doctor for medical advice about side effects. You may report side effects to FDA at 7-407-FDA-9583.  Where should I keep my medicine?  Keep out of the reach of children.  Store at room temperature between 15 and 30 degrees C (59 and 86 degrees F). Throw away any unused medicine after the expiration date.  NOTE: This sheet is a summary. It may not cover all possible information. If you have questions about this medicine, talk to your doctor, pharmacist, or health care provider.  © 2021 [employer] (2020-03-26 17:19:20)  Fluconazole tablets  What is this medicine?  FLUCONAZOLE (floo LEONORA na zole) is an antifungal medicine. It is used to treat certain kinds of fungal or yeast infections.  This medicine may be used for other purposes; ask your health care provider or pharmacist if you have questions.  COMMON BRAND NAME(S): Diflucan  What should I tell my health care provider before I take this medicine?  They need to  know if you have any of these conditions:  · history of irregular heart beat  · kidney disease  · an unusual or allergic reaction to fluconazole, other azole antifungals, medicines, foods, dyes, or preservatives  · pregnant or trying to get pregnant  · breast-feeding  How should I use this medicine?  Take this medicine by mouth. Follow the directions on the prescription label. Do not take your medicine more often than directed.  Talk to your pediatrician regarding the use of this medicine in children. Special care may be needed. This medicine has been used in children as young as 6 months of age.  Overdosage: If you think you have taken too much of this medicine contact a poison control center or emergency room at once.  NOTE: This medicine is only for you. Do not share this medicine with others.  What if I miss a dose?  If you miss a dose, take it as soon as you can. If it is almost time for your next dose, take only that dose. Do not take double or extra doses.  What may interact with this medicine?  Do not take this medicine with any of the following medications:  · astemizole  · certain medicines for irregular heart beat like dronedarone, quinidine  · cisapride  · erythromycin  · lomitapide  · other medicines that prolong the QT interval (cause an abnormal heart rhythm) like encorafenib, lapatinib  · pimozide  · terfenadine  · thioridazine  This medicine may also interact with the following medications:  · antiviral medicines for HIV or AIDS  · birth control pills  · certain antibiotics like rifabutin, rifampin  · certain medicines for blood pressure like amlodipine, isradipine, felodipine, hydrochlorothiazide, losartan, nifedipine  · certain medicines for cancer like cyclophosphamide, ibrutinib, vinblastine, vincristine  · certain medicines for cholesterol like atorvastatin, lovastatin, fluvastatin, simvastatin  · certain medicines for depression, anxiety, or psychotic disturbances like amitriptyline,  midazolam, nortriptyline, triazolam  · certain medicines for diabetes like glipizide, glyburide, tolbutamide  · certain medicines for pain like alfentanil, fentanyl, methadone  · certain medicines for seizures like carbamazepine, phenytoin  · certain medicines that treat or prevent blood clots like warfarin  · dofetilide  · halofantrine  · medicines that lower your chance of fighting infection like cyclosporine, prednisone, tacrolimus  · NSAIDS, medicines for pain and inflammation, like celecoxib, diclofenac, flurbiprofen, ibuprofen, meloxicam, naproxen  · other medicines for fungal infections  · sirolimus  · theophylline  · tofacitinib  · tolvaptan  · ziprasidone  This list may not describe all possible interactions. Give your health care provider a list of all the medicines, herbs, non-prescription drugs, or dietary supplements you use. Also tell them if you smoke, drink alcohol, or use illegal drugs. Some items may interact with your medicine.  What should I watch for while using this medicine?  Visit your doctor or health care professional for regular checkups. If you are taking this medicine for a long time you may need blood work. Tell your doctor if your symptoms do not improve. Some fungal infections need many weeks or months of treatment to cure.  Alcohol can increase possible damage to your liver. Avoid alcoholic drinks.  If you have a vaginal infection, do not have sex until you have finished your treatment. You can wear a sanitary napkin. Do not use tampons. Wear freshly washed cotton, not synthetic, panties.  What side effects may I notice from receiving this medicine?  Side effects that you should report to your doctor or health care professional as soon as possible:  · allergic reactions like skin rash or itching, hives, swelling of the lips, mouth, tongue, or throat  · dark urine  · feeling dizzy or faint  · irregular heartbeat or chest pain  · redness, blistering, peeling or loosening of the skin,  including inside the mouth  · trouble breathing  · unusual bruising or bleeding  · vomiting  · yellowing of the eyes or skin  Side effects that usually do not require medical attention (report to your doctor or health care professional if they continue or are bothersome):  · changes in how food tastes  · diarrhea  · headache  · stomach upset or nausea  This list may not describe all possible side effects. Call your doctor for medical advice about side effects. You may report side effects to FDA at 1-759-KAL-7101.  Where should I keep my medicine?  Keep out of the reach of children.  Store at room temperature below 30 degrees C (86 degrees F). Throw away any medicine after the expiration date.  NOTE: This sheet is a summary. It may not cover all possible information. If you have questions about this medicine, talk to your doctor, pharmacist, or health care provider.  © 2021 [employer] (2020-11-17 14:37:10)

## 2021-04-11 ENCOUNTER — TELEPHONE (OUTPATIENT)
Dept: FAMILY MEDICINE CLINIC | Facility: CLINIC | Age: 26
End: 2021-04-11

## 2021-04-11 NOTE — TELEPHONE ENCOUNTER
Spoke with patient and informed her that her Covid-19 test was negative as was her daughter Nataliia's Covid-19 test.

## 2021-05-10 ENCOUNTER — OFFICE VISIT (OUTPATIENT)
Dept: FAMILY MEDICINE CLINIC | Facility: CLINIC | Age: 26
End: 2021-05-10

## 2021-05-10 VITALS
OXYGEN SATURATION: 96 % | DIASTOLIC BLOOD PRESSURE: 74 MMHG | BODY MASS INDEX: 28.88 KG/M2 | TEMPERATURE: 98.8 F | HEART RATE: 73 BPM | SYSTOLIC BLOOD PRESSURE: 110 MMHG | WEIGHT: 184 LBS | HEIGHT: 67 IN

## 2021-05-10 DIAGNOSIS — G47.19 EXCESSIVE DAYTIME SLEEPINESS: ICD-10-CM

## 2021-05-10 DIAGNOSIS — R41.840 ATTENTION DEFICIT: ICD-10-CM

## 2021-05-10 DIAGNOSIS — F41.9 ANXIETY: Primary | ICD-10-CM

## 2021-05-10 DIAGNOSIS — F51.01 PRIMARY INSOMNIA: ICD-10-CM

## 2021-05-10 PROCEDURE — 99213 OFFICE O/P EST LOW 20 MIN: CPT | Performed by: FAMILY MEDICINE

## 2021-05-10 NOTE — PROGRESS NOTES
Follow Up Office Visit      Patient Name: Yoly Cam  : 1995   MRN: [id number]     Chief Complaint:    Chief Complaint   Patient presents with   • Anxiety   • Snoring       History of Present Illness: Yoly Cam is a 25 y.o. female who is here today to follow up with concerns with attention deficit, uncontrolled anxiety and insomnia and concerns for sleep apnea.      Anxiety - she has been on celexa, zoloft, lexapro, and paxil.  She has not been on cymbalta but has been on effexor. effexor gave her withdrawals and didn't help with anxiety.  Patient has been on several medications and none of them really helped.  Patient is not willing to start another medication at this time.  Patient would like to be referred to behavioral health for further evaluation management.    Insomnia - she has trouble falling asleep and staying asleep.  Patient says a lot of times when she wakes up she worries about getting things done.    Attention and focus problems - family hx of adhd. She has trouble staying on task. She feels like she gets work done but has a lot problems at home.  Patient was not diagnosed with ADHD in high school and she reports having pretty good grades in high school.  Patient says it was hard for her to stay on task during high school.  Patient says she can start school back again and has concerns about her performance with her attention problems.  Patient says mostly her attention problems are worse at home than at school.    Snoring and daytime sleepiness - snores and has non restfull sleep. She also apneic spells occasionally as well.  Patient has a family history of sleep apnea.      Review of systems positive for insomnia, anxiety, attention deficit        Physical exam: Patient's mood and affect is appropriate.  Patient's respiratory status was nonlabored.  Patient's gait was normal.      Subjective        I have reviewed and the following portions of the  "patient's history were updated as appropriate: past family history, past medical history, past social history, past surgical history and problem list.    Medications:   No current outpatient medications on file.    Allergies:   Allergies   Allergen Reactions   • Ceclor [Cefaclor] Hives       Objective     Physical Exam: Please see HPI for physical exam  Vital Signs:   Vitals:    05/10/21 1255   BP: 110/74   Pulse: 73   Temp: 98.8 °F (37.1 °C)   SpO2: 96%   Weight: 83.5 kg (184 lb)   Height: 170.2 cm (67\")   PainSc: 0-No pain     Body mass index is 28.82 kg/m².          Assessment / Plan      Assessment/Plan:   Diagnoses and all orders for this visit:    1. Anxiety (Primary)  -     Ambulatory Referral to Behavioral Health    2. Primary insomnia  -     Ambulatory Referral to Behavioral Health    3. Excessive daytime sleepiness  -     Cancel: Ambulatory Referral to Sleep Medicine  -     Ambulatory Referral to Sleep Medicine    4. Attention deficit  -     Ambulatory Referral to Behavioral Health       Patient presents with felt therapy for anxiety in the past.  Patient's been on multiple medications and we discussed starting Cymbalta today but deferred to behavioral health for further evaluation management.  Patient also wants to be evaluated for ADHD so we will refer to behavioral health as well.  Patient has sleep apnea history in her family and signs symptoms of sleep apnea so we will send her to sleep medicine as well.    Follow-up in 3 to 6 months for annual exam.      Follow Up:   Return in about 3 months (around 8/10/2021).    Slim Fernandez DO  [facility]       Please note that portions of this note may have been completed with a voice recognition program. Efforts were made to edit the dictations, but occasionally words are mistranscribed.   "

## 2021-06-22 ENCOUNTER — TELEMEDICINE (OUTPATIENT)
Dept: PSYCHIATRY | Facility: CLINIC | Age: 26
End: 2021-06-22

## 2021-06-22 DIAGNOSIS — G47.9 SLEEPING DIFFICULTIES: ICD-10-CM

## 2021-06-22 DIAGNOSIS — F41.0 PANIC DISORDER (EPISODIC PAROXYSMAL ANXIETY): ICD-10-CM

## 2021-06-22 DIAGNOSIS — R41.840 ATTENTION DEFICIT: ICD-10-CM

## 2021-06-22 DIAGNOSIS — F41.1 GENERALIZED ANXIETY DISORDER: Primary | Chronic | ICD-10-CM

## 2021-06-22 DIAGNOSIS — F33.42 MAJOR DEPRESSIVE DISORDER, RECURRENT, IN FULL REMISSION (HCC): Chronic | ICD-10-CM

## 2021-06-22 PROCEDURE — 90792 PSYCH DIAG EVAL W/MED SRVCS: CPT | Performed by: NURSE PRACTITIONER

## 2021-06-22 RX ORDER — HYDROXYZINE PAMOATE 25 MG/1
25 CAPSULE ORAL 3 TIMES DAILY PRN
Qty: 90 CAPSULE | Refills: 0 | Status: SHIPPED | OUTPATIENT
Start: 2021-06-22 | End: 2022-11-16

## 2021-06-22 RX ORDER — FLUOXETINE 10 MG/1
CAPSULE ORAL
Qty: 60 CAPSULE | Refills: 0 | Status: SHIPPED | OUTPATIENT
Start: 2021-06-22 | End: 2021-07-22 | Stop reason: SDUPTHER
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
## 2021-07-22 ENCOUNTER — TELEMEDICINE (OUTPATIENT)
Dept: PSYCHIATRY | Facility: CLINIC | Age: 26
End: 2021-07-22

## 2021-07-22 DIAGNOSIS — F41.0 PANIC DISORDER (EPISODIC PAROXYSMAL ANXIETY): ICD-10-CM

## 2021-07-22 DIAGNOSIS — F90.2 ATTENTION DEFICIT HYPERACTIVITY DISORDER (ADHD), COMBINED TYPE: ICD-10-CM

## 2021-07-22 DIAGNOSIS — F33.42 MAJOR DEPRESSIVE DISORDER, RECURRENT, IN FULL REMISSION (HCC): Chronic | ICD-10-CM

## 2021-07-22 DIAGNOSIS — Z79.899 MEDICATION MANAGEMENT: ICD-10-CM

## 2021-07-22 DIAGNOSIS — Z51.81 ENCOUNTER FOR THERAPEUTIC DRUG MONITORING: Primary | ICD-10-CM

## 2021-07-22 DIAGNOSIS — G47.9 SLEEPING DIFFICULTIES: ICD-10-CM

## 2021-07-22 DIAGNOSIS — F41.1 GENERALIZED ANXIETY DISORDER: Primary | Chronic | ICD-10-CM

## 2021-07-22 PROCEDURE — 99214 OFFICE O/P EST MOD 30 MIN: CPT | Performed by: NURSE PRACTITIONER

## 2021-07-22 RX ORDER — FLUOXETINE HYDROCHLORIDE 40 MG/1
40 CAPSULE ORAL DAILY
Qty: 30 CAPSULE | Refills: 0 | Status: SHIPPED | OUTPATIENT
Start: 2021-07-22 | End: 2021-09-15 | Stop reason: SDUPTHER

## 2021-07-22 RX ORDER — DEXTROAMPHETAMINE SACCHARATE, AMPHETAMINE ASPARTATE, DEXTROAMPHETAMINE SULFATE AND AMPHETAMINE SULFATE 1.25; 1.25; 1.25; 1.25 MG/1; MG/1; MG/1; MG/1
5 TABLET ORAL 2 TIMES DAILY
Qty: 60 TABLET | Refills: 0 | Status: SHIPPED | OUTPATIENT
Start: 2021-07-22 | End: 2021-09-15 | Stop reason: SDUPTHER

## 2021-07-22 NOTE — PROGRESS NOTES
"This provider is located at the [facility] (through [facility]), [address], [facility], [address] using a secure [employer] Video Visit through [employer]. Patient is being seen remotely via telehealth at their home address in [place], and stated they are in a secure environment for this session. The patient's condition being diagnosed/treated is appropriate for telemedicine. The provider identified herself as well as her credentials. The patient, and/or patients guardian, consent to be seen remotely, and when consent is given they understand that the consent allows for patient identifiable information to be sent to a third party as needed. They may refuse to be seen remotely at any time. The electronic data is encrypted and password protected, and the patient and/or guardian has been advised of the potential risks to privacy not withstanding such measures.    You have chosen to receive care through a telehealth visit.  Do you consent to use a video/audio connection for your medical care today? Yes     Subjective   Yoly Cam is a 25 y.o. female who is here today for medication management follow up.    Chief Complaint: Anxiety, panic attacks, depression, attention deficit, sleeping difficulties     History of Present Illness: The patient describes her mood as \"better\" over the last few weeks.  Patient endorses that she is noticed improvement in her anxiety since beginning the Prozac.  Patient states that she is tolerating the medication well and states that anxiety is more manageable at this time.  Patient endorses that she is still having some anxiety and thinks that an increased dose of this medication will be beneficial.  Patient endorses that she still struggling significantly with focus and concentration.  Patient endorses that she has not noticed any improvement in the symptoms as her anxiety has improved.  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  Patient rates difficulty with focus and concentration 8/10 on a 0-10 scale, with 10 being the worst.  The patient reports her appetite as good.  The patient reports her sleep as fluctuating.  The patient denies any nightmares or bad dreams.  Patient states that some nights she sleeps throughout the night without waking up but states that other nights she will awaken and have difficulty falling back to sleep. Patient states that overall her sleep has been improved since last visit.  The patient denies any new medical problems or changes in medications since last appointment with this facility.  The patient reports compliance with current medication regimen.  The patient denies any current side effects from her current medication regimen.  The patient denies any abnormal muscle movements or tics.  The patient rates her depression at a 0/10 on a 0-10 scale, with 10 being the worst.  The patient rates her anxiety at a 5/10 on a 0-10 scale, with 10 being the worst.  Patient state that her anxiety fluctuates based on the day and situational stressors, but states that overall she has noticed an improvement in anxiety since beginning the Prozac.  The patient rates hopelessness at a 0/10 on a 0-10 scale with 10 being the worst.  The patient denies suicidal ideations and homicidal ideations, and is convincing.  The patient denies any auditory hallucinations or visual hallucinations.  The patient does not endorse significant symptoms consistent with bipolar disorder or a psychotic illness.            LMP:  Patient states she her menstrual cycles are very irregular due to having PCOS.  The patient denies any chance of pregnancy at this time.  The patient was educated that her prescribed medications can have potential risk to a developing fetus. The patient is advised to contact this APRN/this office if she becomes pregnant or plans to become pregnant.  Pt verbalizes  understanding and acknowledged agreement with this plan in her own words.        The following portions of the patient's history were reviewed and updated as appropriate: allergies, current medications, past family history, past medical history, past social history, past surgical history and problem list.    Review of Systems   Constitutional: Negative for activity change, appetite change, fatigue and unexpected weight change.   Psychiatric/Behavioral: Positive for decreased concentration and sleep disturbance. Negative for agitation, behavioral problems, confusion, dysphoric mood, hallucinations, self-injury and suicidal ideas. The patient is nervous/anxious and is hyperactive.        Objective   Physical Exam  Constitutional:       Appearance: Normal appearance.   Neurological:      Mental Status: She is alert.   Psychiatric:         Attention and Perception: She is inattentive.         Mood and Affect: Affect normal. Mood is anxious.         Speech: Speech normal.         Behavior: Behavior normal. Behavior is cooperative.         Thought Content: Thought content normal. Thought content does not include homicidal or suicidal ideation. Thought content does not include homicidal or suicidal plan.         Cognition and Memory: Cognition and memory normal.         Judgment: Judgment normal.       not currently breastfeeding.    The patient was seen remotely today via a MyChart Video Visit through [facility].  Unable to obtain vital signs due to nature of remote visit.  Height stated at 67 inches.  Weight stated at 184 pounds.     Allergies   Allergen Reactions   • Ceclor [Cefaclor] Hives       No results found for this or any previous visit (from the past 2016 hour(s)).    Current Medications:   Current Outpatient Medications   Medication Sig Dispense Refill   • FLUoxetine (PROzac) 40 MG capsule Take 1 capsule by mouth Daily. 30 capsule 0   • hydrOXYzine pamoate (VISTARIL) 25 MG capsule Take 1 capsule by mouth 3 (Three)  Times a Day As Needed for Anxiety. 90 capsule 0   • amphetamine-dextroamphetamine (ADDERALL) 5 MG tablet Take 1 tablet by mouth 2 (Two) Times a Day. 60 tablet 0     No current facility-administered medications for this visit.       Mental Status Exam:   Hygiene:   good  Cooperation:  Cooperative  Eye Contact:  Good  Psychomotor Behavior:  Appropriate  Affect:  Full range  Hopelessness: Denies  Speech:  Normal  Thought Process:  Goal directed  Thought Content:  Normal  Suicidal:  None  Homicidal:  None  Hallucinations:  None  Delusion:  None  Memory:  Intact  Orientation:  Person, Place, Time and Situation  Reliability:  good  Insight:  Good  Judgement:  Good  Impulse Control:  Good  Physical/Medical Issues:  Yes See medical history    PHQ-9 Depression Screening  Little interest or pleasure in doing things? (P) 0   Feeling down, depressed, or hopeless? (P) 0   Trouble falling or staying asleep, or sleeping too much? (P) 1   Feeling tired or having little energy? (P) 2   Poor appetite or overeating? (P) 1   Feeling bad about yourself - or that you are a failure or have let yourself or your family down? (P) 2   Trouble concentrating on things, such as reading the newspaper or watching television? (P) 3   Moving or speaking so slowly that other people could have noticed? Or the opposite - being so fidgety or restless that you have been moving around a lot more than usual? (P) 1   Thoughts that you would be better off dead, or of hurting yourself in some way? (P) 0   PHQ-9 Total Score (P) 10   If you checked off any problems, how difficult have these problems made it for you to do your work, take care of things at home, or get along with other people? (P) Very difficult        PHQ-Score Total:  PHQ-9 Total Score: (P) 10    JACQUIE-7  Feeling nervous, anxious or on edge: (P) Several days  Not being able to stop or control worrying: (P) Several days  Worrying too much about different things: (P) More than half the  days  Trouble Relaxing: (P) Not at all  Being so restless that it is hard to sit still: (P) Several days  Feeling afraid as if something awful might happen: (P) Several days  Becoming easily annoyed or irritable: (P) Several days  JACQUIE 7 Total Score: (P) 7  If you checked any problems, how difficult have these problems made it for you to do your work, take care of things at home, or get along with other people: (P) Somewhat difficult       PROMIS scale screening tool that patient filled out virtually reviewed by this APRN at today's encounter.     The NOELLE report, request number [id number], of the past 12 months were reviewed.      Assessment/Plan   Diagnoses and all orders for this visit:    1. Generalized anxiety disorder (Primary)  -     FLUoxetine (PROzac) 40 MG capsule; Take 1 capsule by mouth Daily.  Dispense: 30 capsule; Refill: 0    2. Panic disorder (episodic paroxysmal anxiety)  -     FLUoxetine (PROzac) 40 MG capsule; Take 1 capsule by mouth Daily.  Dispense: 30 capsule; Refill: 0    3. Major depressive disorder, recurrent, in full remission (CMS/HCC)  -     FLUoxetine (PROzac) 40 MG capsule; Take 1 capsule by mouth Daily.  Dispense: 30 capsule; Refill: 0    4. Sleeping difficulties    5. Attention deficit hyperactivity disorder (ADHD), combined type  -     amphetamine-dextroamphetamine (ADDERALL) 5 MG tablet; Take 1 tablet by mouth 2 (Two) Times a Day.  Dispense: 60 tablet; Refill: 0    6. Medication management  -     Urine Drug Screen - Urine, Clean Catch; Future            Visit Diagnoses:    ICD-10-CM ICD-9-CM   1. Generalized anxiety disorder  F41.1 300.02   2. Panic disorder (episodic paroxysmal anxiety)  F41.0 300.01   3. Major depressive disorder, recurrent, in full remission (CMS/HCC)  F33.42 296.36   4. Sleeping difficulties  G47.9 780.50   5. Attention deficit hyperactivity disorder (ADHD), combined type  F90.2 314.01   6. Medication management  Z79.899 V58.69       GOALS:  Short Term Goals:  Patient will be compliant with medication, and patient will have no significant medication related side effects.  Patient will be engaged in psychotherapy as indicated.  Patient will report subjective improvement of symptoms.  Long term goals: To stabilize mood and treat/improve subjective symptoms, the patient will stay out of the hospital, the patient will be at an optimal level of functioning, and the patient will take all medications as prescribed.  The patient verbalized understanding and agreement with goals that were mutually set.      TREATMENT PLAN/GOALS: Continue medications and treatment plan as indicated. Treatment and medication options discussed during today's visit. Patient acknowledged and verbally consented to continue with current treatment plan and was educated on the importance of compliance with treatment and follow-up appointments.      -Increase Prozac to 40 mg daily  -Continue hydroxyzine 25 mg three times daily as needed for anxiety  -Complete urine drug screen for medication management.  Instructed patient to complete urine drug screen before beginning Adderall prescribed at today's visit patient verbalizes understanding.  -Begin Adderall 5 mg twice daily for ADHD      MEDICATION ISSUES: Discussed medication options and treatment plan of prescribed medication, any off label use of medication, as well as the risks, benefits, any black box warnings including increased suicidality, and side effects including but not limited to potential falls, dizziness, possible impaired driving, GI side effects (change in appetite, abdominal discomfort, nausea, vomiting, diarrhea, and/or constipation), dry mouth, somnolence, sedation, insomnia, activation, agitation, irritation, tremors, abnormal muscle movements or disorders, headache, sweating, possible bruising or rare bleeding, electrolyte and/or fluid abnormalities, change in blood pressure/heart rate/and or heart rhythm, sexual dysfunction, and  metabolic adversities among others. Patient and/or guardian agreeable to call the office with any worsening of symptoms or onset of side effects, or if any concerns or questions arise.  The contact information for the office is made available to the patient and/or guardian.  Patient and/or guardian agreeable to call 911 or go to the nearest ER should they begin having any SI/HI, or if any urgent concerns arise. No medication side effects or related complaints today.    The patient is being prescribed a controlled substance as part of the treatment plan. The patient/guardian has been educated of appropriate use of the medication(s), including risks and side effects such as insomnia, headache, exacerbation of tics, nervousness, irritability, overstimulation, tremor, dizziness, anorexia or change in appetite, nausea, dry mouth, constipation, diarrhea, weight loss, sexual dysfunction, psychotic episodes, seizures, palpitations, tachycardia, hypertension, rare activation (activation of hypomania, tamia, and/or suicidal ideations), cardiovascular adverse effects including sudden death (especially patients with pre-existing structural abnormalities often associated with a family history of cardiac disease), may slow normal growth in children, weight gain is reported but not expected, sedation is possible but activation is much more common, metabolic adversities, and overdose among others. Patient/guardian is also informed that the medication is to be used by the patient only, the medication is to be used only as directed, and the medication should not be combined with other substances unless directed by a Provider/Prescriber. The patient/guardian verbalized understanding and agreement with this in their own words, and wish to continue with current treatment plan.          Controlled Substance Medication Contract  Controlled substance medications (i.e. benzodiazepines, opioids, amphetamines) are very useful, but have a high  "potential for misuse and are, therefore, closely controlled by local, state, and federal government(s). As a patient of [facility], you agree and understand the followin. I am responsible for the controlled substance medications prescribed to me. If my prescription is misplaced, stolen, or if \"I run out early,\" I understand this medication will not be replaced regardless of the circumstances.  2. Refills of controlled substance medications: (a) Will be made only during regular office hours Monday-Thursday once a month and during a scheduled virtual appointment. Refills will not be made at night, weekends, or on holidays. (b) Will not be made if \"I lost my prescriptions,\" \"ran out early,\" or \"misplaced my medication\". I am solely responsible for taking the medication as prescribed and for keeping track of the remaining.   3. I agree to comply with urine drug testing and pill counts at the provider's discretion, thereby, documenting the proper use of any medications. If alcohol abuse is suspected, a breathalyzer or blood alcohol level may be ordered. Unannounced urine or serum toxicology specimens may be requested and my cooperation is required.  4. I understand that if I violate any of the above conditions, my prescriptions for controlled medications my be terminated. If the violation involves obtaining these medications from another individual, or the concomitant use of non-prescription illicit (illegal) drugs, I may also be reported to other providers, pharmacies, medical facilities and the appropriate authorities.   5. I further understand that if I violate this controlled substance contract due to non-compliance of medical directions, such as the failure in taking medications as prescribed, utilizing other illicit drugs, or abuse of controlled medications, the prescription for controlled medications may be terminated.   6. I agree to keep my scheduled appointments and conduct " myself in a courteous manner.  7. I agree not to sell, share, or give any medication to another person. I understand that such mishandling of my medication is a serious violation of this agreement and would result in my treatment being terminated without any recourse for appeal.   8. I agree not to take my medication from any physicians, nurse practitioners, pharmacies or other sources without telling my prescriber.  9. I agree to take my medication as my prescriber has instructed and not to alter the way I take my medication without first consulting my prescriber.  10. I agree to abstain from problematic alcohol usage, opioids, marijuana, cocaine, and other addictive substances.   11. If I am legally involved related to legal or illegal drugs, including alcohol, refill of controlled substances will not be given until a re-evaluation of my chemical dependency treatment plan has been completed. Refills are at the discretion of the prescriber.   12. I agree to fill all of my controlled medications at an in-state ([place]) pharmacy.   13. I understand that [facility] utilizes the [place] All Schedule Prescription Electronic Reporting (NOELLE) system and will monitor my prescription history via this source.  14. Benzodiazepines are drugs prescribed to treat conditions like anxiety, insomnia, and seizures. Examples of these drugs include: alprazolam, clonazepam, diazepam, and lorazepam. The FDA has applied a Black Box Warning (one of the strictest warnings) that the use of opioids and benzodiazepines together have serious risks to include unusual dizziness or lightheadedness, extreme sleepiness, slowed or difficult breathing, coma and death. There is an added risk if alcohol is also ingested. It is the policy of [facility] to NOT prescribe benzodiazepines to patients who also use opioids. If a patient already presents already prescribed both, the prescriber my  direct the patient to their previous provider who prescribed it or taper the benzodiazepine as part of the treatment plan. These patients must be monitored at appropriate intervals and so visits may be more frequent.     This APRN has discussed and reviewed this information with the patient and/or guardian. The patient and/or guardian verbally agreed (no signatures are obtained during today's visit as they are a telehealth patient and is unable to print and sign this document, therefore, verbal agreement has been obtained).       MEDS ORDERED DURING VISIT:  New Medications Ordered This Visit   Medications   • FLUoxetine (PROzac) 40 MG capsule     Sig: Take 1 capsule by mouth Daily.     Dispense:  30 capsule     Refill:  0   • amphetamine-dextroamphetamine (ADDERALL) 5 MG tablet     Sig: Take 1 tablet by mouth 2 (Two) Times a Day.     Dispense:  60 tablet     Refill:  0       Return in about 4 weeks (around 8/19/2021), or if symptoms worsen or fail to improve, for Recheck.        Patient will follow-up in 4 weeks, highly encouraged the patient if she had any questions or concerns to contact the behavioral health [facility] clinic for sooner appointment patient verbalized understanding.      Functional Status: Moderate impairment     Prognosis: Guarded with Ongoing Treatment            This document has been electronically signed by MILA Mcallister  July 22, 2021 15:56 EDT    Part of this note may be an electronic transcription/translation of spoken language to printed text using the Dragon Dictation System.

## 2021-07-23 ENCOUNTER — LAB (OUTPATIENT)
Dept: FAMILY MEDICINE CLINIC | Facility: CLINIC | Age: 26
End: 2021-07-23

## 2021-07-23 DIAGNOSIS — Z51.81 ENCOUNTER FOR THERAPEUTIC DRUG MONITORING: ICD-10-CM

## 2021-07-23 DIAGNOSIS — Z51.81 ENCOUNTER FOR THERAPEUTIC DRUG MONITORING: Primary | ICD-10-CM

## 2021-09-15 DIAGNOSIS — F33.42 MAJOR DEPRESSIVE DISORDER, RECURRENT, IN FULL REMISSION (HCC): Chronic | ICD-10-CM

## 2021-09-15 DIAGNOSIS — F41.0 PANIC DISORDER (EPISODIC PAROXYSMAL ANXIETY): ICD-10-CM

## 2021-09-15 DIAGNOSIS — F41.1 GENERALIZED ANXIETY DISORDER: Chronic | ICD-10-CM

## 2021-09-15 DIAGNOSIS — F90.2 ATTENTION DEFICIT HYPERACTIVITY DISORDER (ADHD), COMBINED TYPE: ICD-10-CM

## 2021-09-15 RX ORDER — FLUOXETINE HYDROCHLORIDE 40 MG/1
40 CAPSULE ORAL DAILY
Qty: 30 CAPSULE | Refills: 0 | Status: SHIPPED | OUTPATIENT
Start: 2021-09-15 | End: 2021-10-11 | Stop reason: SDUPTHER

## 2021-09-15 RX ORDER — DEXTROAMPHETAMINE SACCHARATE, AMPHETAMINE ASPARTATE, DEXTROAMPHETAMINE SULFATE AND AMPHETAMINE SULFATE 1.25; 1.25; 1.25; 1.25 MG/1; MG/1; MG/1; MG/1
5 TABLET ORAL 2 TIMES DAILY
Qty: 60 TABLET | Refills: 0 | Status: SHIPPED | OUTPATIENT
Start: 2021-09-15 | End: 2021-10-11 | Stop reason: SDUPTHER

## 2021-10-11 ENCOUNTER — TELEMEDICINE (OUTPATIENT)
Dept: PSYCHIATRY | Facility: CLINIC | Age: 26
End: 2021-10-11

## 2021-10-11 DIAGNOSIS — F90.2 ATTENTION DEFICIT HYPERACTIVITY DISORDER (ADHD), COMBINED TYPE: Primary | Chronic | ICD-10-CM

## 2021-10-11 DIAGNOSIS — F41.1 GENERALIZED ANXIETY DISORDER: Chronic | ICD-10-CM

## 2021-10-11 DIAGNOSIS — F41.0 PANIC DISORDER (EPISODIC PAROXYSMAL ANXIETY): ICD-10-CM

## 2021-10-11 DIAGNOSIS — G47.9 SLEEPING DIFFICULTIES: ICD-10-CM

## 2021-10-11 DIAGNOSIS — F33.42 MAJOR DEPRESSIVE DISORDER, RECURRENT, IN FULL REMISSION (HCC): Chronic | ICD-10-CM

## 2021-10-11 PROCEDURE — 99214 OFFICE O/P EST MOD 30 MIN: CPT | Performed by: NURSE PRACTITIONER

## 2021-10-11 RX ORDER — FLUOXETINE HYDROCHLORIDE 40 MG/1
CAPSULE ORAL
Qty: 30 CAPSULE | Refills: 0 | Status: SHIPPED | OUTPATIENT
Start: 2021-10-11 | End: 2021-11-08

## 2021-10-11 RX ORDER — FLUOXETINE HYDROCHLORIDE 20 MG/1
CAPSULE ORAL
Qty: 30 CAPSULE | Refills: 0 | Status: SHIPPED | OUTPATIENT
Start: 2021-10-11 | End: 2021-11-08

## 2021-10-11 RX ORDER — DEXTROAMPHETAMINE SACCHARATE, AMPHETAMINE ASPARTATE, DEXTROAMPHETAMINE SULFATE AND AMPHETAMINE SULFATE 1.25; 1.25; 1.25; 1.25 MG/1; MG/1; MG/1; MG/1
5 TABLET ORAL DAILY
Qty: 30 TABLET | Refills: 0 | Status: SHIPPED | OUTPATIENT
Start: 2021-10-11 | End: 2021-11-24 | Stop reason: SDUPTHER

## 2021-10-11 NOTE — PROGRESS NOTES
"This provider is located at the [facility] (formerly [facility]) (through [facility]), [address], [facility], [address] using a secure [employer] Video Visit through [employer]. Patient is being seen remotely via telehealth at their home address in [place], and stated they are in a secure environment for this session. The patient's condition being diagnosed/treated is appropriate for telemedicine. The provider identified herself as well as her credentials. The patient, and/or patients guardian, consent to be seen remotely, and when consent is given they understand that the consent allows for patient identifiable information to be sent to a third party as needed. They may refuse to be seen remotely at any time. The electronic data is encrypted and password protected, and the patient and/or guardian has been advised of the potential risks to privacy not withstanding such measures.    You have chosen to receive care through a telehealth visit.  Do you consent to use a video/audio connection for your medical care today? Yes     Subjective   Yoly Cam is a 25 y.o. female who is here today for medication management follow up.    Chief Complaint: Anxiety, panic attacks, depression, attention deficit, sleeping difficulties     History of Present Illness:  The patient describes her mood as \"good\" over the last few weeks.  Patient states that she is been feeling a lot better recently.  Patient states that anxiety and depression have been much more well controlled since beginning the increased dose of Prozac.  Patient states that she has had some exacerbations of depression and anxiety recently due to her grandmother and her 's grandmother passing away recently due to COVID, but endorses that overall symptoms have been well controlled with increased dose of Prozac.  Patient states that ADHD symptoms have been significantly improved since beginning the Adderall.  Patient states that she " was unable to tolerate taking it twice daily as it caused a slight increase in anxiety, but endorses that taking the 5 mg once daily has been well tolerated and resulted in a significant reduction in ADHD symptoms.  Discussed with patient increasing Prozac to further manage depression and anxiety and patient endorses that she is agreeable to this.  Patient states that she would like to continue the Adderall at current dose as it has been very effective for ADHD symptoms and tolerated well without any side effects.  The patient reports her appetite as good.  The patient reports her sleep as good.  Patient states that she will occasionally wake up, but endorses that she is able to go back to sleep easily and sleep has been improved over the past couple of months.  The patient denies any nightmares or bad dreams.  The patient denies any new medical problems or changes in medications since last appointment with this facility.  The patient denies any abnormal muscle movements or tics.  The patient rates her depression at a 3/10 on a 0-10 scale, with 10 being the worst.  The patient rates her anxiety at a 5/10 on a 0-10 scale, with 10 being the worst.  The patient rates her ADHD 2/10 on a 0-10 scale, with 10 being the worst.  The patient rates hopelessness at a 0/10 on a 0-10 scale with 10 being the worst.  The patient denies suicidal ideations and homicidal ideations, and is convincing.  The patient denies any auditory hallucinations or visual hallucinations.  The patient does not endorse significant symptoms consistent with bipolar disorder or a psychotic illness.                LMP:  Patient states she her menstrual cycles are very irregular due to having PCOS.  The patient denies any chance of pregnancy at this time.  The patient was educated that her prescribed medications can have potential risk to a developing fetus. The patient is advised to contact this APRN/this office if she becomes pregnant or plans to become  pregnant.  Pt verbalizes understanding and acknowledged agreement with this plan in her own words.        The following portions of the patient's history were reviewed and updated as appropriate: allergies, current medications, past family history, past medical history, past social history, past surgical history and problem list.    Review of Systems   Constitutional: Positive for fatigue. Negative for activity change, appetite change and unexpected weight change.   Psychiatric/Behavioral: Positive for decreased concentration and dysphoric mood. Negative for agitation, behavioral problems, confusion, hallucinations, self-injury, sleep disturbance and suicidal ideas. The patient is nervous/anxious. The patient is not hyperactive.        Objective   Physical Exam  Constitutional:       Appearance: Normal appearance.   Neurological:      Mental Status: She is alert.   Psychiatric:         Attention and Perception: Attention normal.         Mood and Affect: Affect normal. Mood is anxious.         Speech: Speech normal.         Behavior: Behavior normal. Behavior is cooperative.         Thought Content: Thought content normal. Thought content does not include homicidal or suicidal ideation. Thought content does not include homicidal or suicidal plan.         Cognition and Memory: Cognition and memory normal.         Judgment: Judgment normal.       not currently breastfeeding.    The patient was seen remotely today via a MyChart Video Visit through [facility].  Unable to obtain vital signs due to nature of remote visit.  Height stated at 67 inches.  Weight stated at 184 pounds.     Allergies   Allergen Reactions   • Ceclor [Cefaclor] Hives       No results found for this or any previous visit (from the past 2016 hour(s)).    Current Medications:   Current Outpatient Medications   Medication Sig Dispense Refill   • amphetamine-dextroamphetamine (ADDERALL) 5 MG tablet Take 1 tablet by mouth Daily. 30 tablet 0   • FLUoxetine  (PROzac) 40 MG capsule Take 1 capsule by mouth daily with 20 mg capsule. 30 capsule 0   • hydrOXYzine pamoate (VISTARIL) 25 MG capsule Take 1 capsule by mouth 3 (Three) Times a Day As Needed for Anxiety. 90 capsule 0   • FLUoxetine (PROzac) 20 MG capsule Take 1 capsule by mouth daily with 40 mg capsule. 30 capsule 0     No current facility-administered medications for this visit.       Mental Status Exam:   Hygiene:   good  Cooperation:  Cooperative  Eye Contact:  Good  Psychomotor Behavior:  Appropriate  Affect:  Full range  Hopelessness: Denies  Speech:  Normal  Thought Process:  Goal directed  Thought Content:  Normal  Suicidal:  None  Homicidal:  None  Hallucinations:  None  Delusion:  None  Memory:  Intact  Orientation:  Person, Place, Time and Situation  Reliability:  good  Insight:  Good  Judgement:  Good  Impulse Control:  Good  Physical/Medical Issues:  Yes See medical history    PHQ-9 Depression Screening  Little interest or pleasure in doing things? (P) 1   Feeling down, depressed, or hopeless? (P) 0   Trouble falling or staying asleep, or sleeping too much? (P) 1   Feeling tired or having little energy? (P) 3   Poor appetite or overeating? (P) 0   Feeling bad about yourself - or that you are a failure or have let yourself or your family down? (P) 1   Trouble concentrating on things, such as reading the newspaper or watching television? (P) 0   Moving or speaking so slowly that other people could have noticed? Or the opposite - being so fidgety or restless that you have been moving around a lot more than usual? (P) 0   Thoughts that you would be better off dead, or of hurting yourself in some way? (P) 0   PHQ-9 Total Score (P) 6   If you checked off any problems, how difficult have these problems made it for you to do your work, take care of things at home, or get along with other people? (P) Not difficult at all        PHQ-Score Total:  PHQ-9 Total Score: (P) 6    JACQUIE-7  Feeling nervous, anxious or on  edge: (P) Several days  Not being able to stop or control worrying: (P) Several days  Worrying too much about different things: (P) Several days  Trouble Relaxing: (P) Not at all  Being so restless that it is hard to sit still: (P) Not at all  Feeling afraid as if something awful might happen: (P) Not at all  Becoming easily annoyed or irritable: (P) Several days  JACQUIE 7 Total Score: (P) 4  If you checked any problems, how difficult have these problems made it for you to do your work, take care of things at home, or get along with other people: (P) Not difficult at all       PROMIS scale screening tool that patient filled out virtually reviewed by this APRN at today's encounter.         Assessment/Plan   Diagnoses and all orders for this visit:    1. Attention deficit hyperactivity disorder (ADHD), combined type (Primary)  -     amphetamine-dextroamphetamine (ADDERALL) 5 MG tablet; Take 1 tablet by mouth Daily.  Dispense: 30 tablet; Refill: 0    2. Generalized anxiety disorder  -     FLUoxetine (PROzac) 40 MG capsule; Take 1 capsule by mouth daily with 20 mg capsule.  Dispense: 30 capsule; Refill: 0  -     FLUoxetine (PROzac) 20 MG capsule; Take 1 capsule by mouth daily with 40 mg capsule.  Dispense: 30 capsule; Refill: 0    3. Panic disorder (episodic paroxysmal anxiety)  -     FLUoxetine (PROzac) 40 MG capsule; Take 1 capsule by mouth daily with 20 mg capsule.  Dispense: 30 capsule; Refill: 0  -     FLUoxetine (PROzac) 20 MG capsule; Take 1 capsule by mouth daily with 40 mg capsule.  Dispense: 30 capsule; Refill: 0    4. Major depressive disorder, recurrent, in full remission (HCC)  -     FLUoxetine (PROzac) 40 MG capsule; Take 1 capsule by mouth daily with 20 mg capsule.  Dispense: 30 capsule; Refill: 0  -     FLUoxetine (PROzac) 20 MG capsule; Take 1 capsule by mouth daily with 40 mg capsule.  Dispense: 30 capsule; Refill: 0    5. Sleeping difficulties            Visit Diagnoses:    ICD-10-CM ICD-9-CM   1.  Attention deficit hyperactivity disorder (ADHD), combined type  F90.2 314.01   2. Generalized anxiety disorder  F41.1 300.02   3. Panic disorder (episodic paroxysmal anxiety)  F41.0 300.01   4. Major depressive disorder, recurrent, in full remission (HCC)  F33.42 296.36   5. Sleeping difficulties  G47.9 780.50       GOALS:  Short Term Goals: Patient will be compliant with medication, and patient will have no significant medication related side effects.  Patient will be engaged in psychotherapy as indicated.  Patient will report subjective improvement of symptoms.  Long term goals: To stabilize mood and treat/improve subjective symptoms, the patient will stay out of the hospital, the patient will be at an optimal level of functioning, and the patient will take all medications as prescribed.  The patient verbalized understanding and agreement with goals that were mutually set.      SUICIDE RISK ASSESSMENT: Unalterable demographics and a history of mental health intervention indicate this patient is in a high risk category compared to the general population. At present, the patient denies active SI/HI, intentions, or plans at this time and agrees to seek immediate care should such thoughts develop. The patient verbalizes understanding of how to access emergency care if needed and agrees to do so. Consideration of suicide risk and protective factors such as history, current presentation, individual strengths and weaknesses, psychosocial and environmental stressors and variables, psychiatric illness and symptoms, medical conditions and pain, took place in this interview. Based on those considerations, the patient is determined: within individual baseline and presenting no imminent risk for suicide or homicide. Other recommendations: The patient does not meet the criteria for inpatient admission and is not a safety risk to self or others at today's visit. Inpatient treatment offers no significant advantages over  outpatient treatment for this patient at today's visit.      SAFETY PLAN:  Patient was given ample time for questions and fully participated in treatment planning.  Patient was encouraged to call the clinic with any questions or concerns.  Patient was informed of access to emergency care. If patient were to develop any significant symptomatology, suicidal ideation, homicidal ideation, any concerns, or feel unsafe at any time they are to call the clinic and if unable to get immediate assistance should immediately call 911 or go to the nearest emergency room.  The patient is advised to remove or secure (lock away) all lethal weapons (including guns) and sharps (including razors, scissors, knives, etc.).  All medications (including any prescribed and any over the counter medications) should be stored in a safe and secured location that is not obtainable by children/adolescents.  Patient was given an opportunity and encouraged to ask questions about their medication, illness, and treatment. Patient contracted verbally for the following: If you are experiencing an emotional crisis or have thoughts of harming yourself or others, please go to your nearest local emergency room or call 911. Will continue to re-assess medication response and side effects frequently to establish efficacy and ensure safety. Risks, any black box warnings, side effects, off label usage, and benefits of medication and treatment discussed with patient, along with potential adverse side effects of current and/or newly prescribed medication, alternative treatment options, and OTC medications.  Patient verbalized understanding of potential risks, any off label use of medication, any black box warnings, and any side effects in their own words. The patient verbalized understanding and agreed to comply with the safety plan discussed in their own words.  Patient given the number to the office. Number also available to the 24- hour suicide  hotline.      TREATMENT PLAN/GOALS: Continue medications and treatment plan as indicated. Treatment and medication options discussed during today's visit. Patient acknowledged and verbally consented to continue with current treatment plan and was educated on the importance of compliance with treatment and follow-up appointments.      -Increase Prozac to 60 mg daily for anxiety and depression   -Continue hydroxyzine 25 mg three times daily as needed for anxiety.  Patient endorses that she does not need a refill for this medication at this time as she has not used it very frequently.  Will refill medication when needed.  -Decrease Adderall to 5 mg daily for ADHD.  Patient states that the twice daily dosing caused a slight increase in anxiety and endorses that she has tolerated once daily dosing much better with significant reduction in ADHD symptoms.  Patient endorses that a refill for her Adderall was sent in on 9/15/2021, but endorses that since she had only been taking the medication once daily she did not need a refill and did not pick the medication up.  Discussed with patient that the decreased dose of Adderall 5 mg once daily will be sent in at this time and patient verbalizes understanding and is agreeable to this.  -Patient endorses that she completed her urine drug screen on 7/23/21 prior to beginning the Adderall.  Discussed with patient that her medical record does reflect that she completed her urine drug screen and that had been collected by the lab, but that no results were in her medical record.  Discussed with patient that this provider will contact the resulting lab for results and if they cannot be obtained a repeat urine drug screen will be ordered at that time.  Patient verbalizes understanding and endorses that she is agreeable to this.      MEDICATION ISSUES: Discussed medication options and treatment plan of prescribed medication, any off label use of medication, as well as the risks, benefits,  any black box warnings including increased suicidality, and side effects including but not limited to potential falls, dizziness, possible impaired driving, GI side effects (change in appetite, abdominal discomfort, nausea, vomiting, diarrhea, and/or constipation), dry mouth, somnolence, sedation, insomnia, activation, agitation, irritation, tremors, abnormal muscle movements or disorders, headache, sweating, possible bruising or rare bleeding, electrolyte and/or fluid abnormalities, change in blood pressure/heart rate/and or heart rhythm, sexual dysfunction, and metabolic adversities among others. Patient and/or guardian agreeable to call the office with any worsening of symptoms or onset of side effects, or if any concerns or questions arise.  The contact information for the office is made available to the patient and/or guardian.  Patient and/or guardian agreeable to call 911 or go to the nearest ER should they begin having any SI/HI, or if any urgent concerns arise. No medication side effects or related complaints today.    The patient is being prescribed a controlled substance as part of the treatment plan. The patient/guardian has been educated of appropriate use of the medication(s), including risks and side effects such as insomnia, headache, exacerbation of tics, nervousness, irritability, overstimulation, tremor, dizziness, anorexia or change in appetite, nausea, dry mouth, constipation, diarrhea, weight loss, sexual dysfunction, psychotic episodes, seizures, palpitations, tachycardia, hypertension, rare activation (activation of hypomania, tamia, and/or suicidal ideations), cardiovascular adverse effects including sudden death (especially patients with pre-existing structural abnormalities often associated with a family history of cardiac disease), may slow normal growth in children, weight gain is reported but not expected, sedation is possible but activation is much more common, metabolic adversities,  and overdose among others. Patient/guardian is also informed that the medication is to be used by the patient only, the medication is to be used only as directed, and the medication should not be combined with other substances unless directed by a Provider/Prescriber. The patient/guardian verbalized understanding and agreement with this in their own words, and wish to continue with current treatment plan.    Without the prescribed medication for ADHD, it is reported that the patient has problems with attention and focus including being easily distracted, easily losing objects, trouble with time management, trouble completing tasks because of distractions, procrastination, indecisiveness, careless mistakes in daily activities/work/school, and not finishing jobs that are started.  Patient denies any side effects, no worsening of insomnia, and no worsening of anxiety on the medication dose.  Due to the reported problems without the medication usage, as well as the significant improvement in symptoms with the medication usage, the patient requests to remain on the prescribed medication for ADHD.            [facility] No Show Policy:  We understand unexpected circumstances arise; however, anytime you miss your appointment we are unable to provide you appropriate care.  In addition, each appointment missed could have been used to provide care for others.  We ask that you call at least 24 hours in advance to cancel or reschedule an appointment.  We would like to take this opportunity to remind you of our policy stating patients who miss THREE or more appointments without cancelling or rescheduling 24 hours in advance of the appointment may be subject to cancellation of any further visits with our clinic and recommendation to seek in-person services/visits.    Please call [phone] to reschedule your appointment. If there are reasons that make it difficult for you to keep the appointments, please  call and let us know how we can help.  Please understand that medication prescribing will not continue without seeing your provider.      [facility]'s No Show Policy reviewed with patient at today's visit. Patient verbalized understanding of this policy. Discussed with patient that in the event that there are three or more no show visits, it will be recommended that they pursue in-person services/visits as noncompliance with telehealth visits indicates that patient is not an appropriate candidate for telemedicine and would likely be more appropriate for in-person services/visits. Patient verbalizes understanding and is agreeable to this.        MEDS ORDERED DURING VISIT:  New Medications Ordered This Visit   Medications   • FLUoxetine (PROzac) 40 MG capsule     Sig: Take 1 capsule by mouth daily with 20 mg capsule.     Dispense:  30 capsule     Refill:  0   • amphetamine-dextroamphetamine (ADDERALL) 5 MG tablet     Sig: Take 1 tablet by mouth Daily.     Dispense:  30 tablet     Refill:  0   • FLUoxetine (PROzac) 20 MG capsule     Sig: Take 1 capsule by mouth daily with 40 mg capsule.     Dispense:  30 capsule     Refill:  0       Return in about 4 weeks (around 11/8/2021), or if symptoms worsen or fail to improve, for Recheck.        Patient will follow-up in 4 weeks, highly encouraged the patient if she had any questions or concerns to contact the behavioral health virtual clinic for sooner appointment patient verbalized understanding.      Functional Status: Mild impairment     Prognosis: Fair with Ongoing Treatment             This document has been electronically signed by MILA Mcallister  October 11, 2021 11:24 EDT    Part of this note may be an electronic transcription/translation of spoken language to printed text using the Dragon Dictation System.

## 2021-10-26 ENCOUNTER — OFFICE VISIT (OUTPATIENT)
Dept: FAMILY MEDICINE CLINIC | Facility: CLINIC | Age: 26
End: 2021-10-26

## 2021-10-26 VITALS
HEART RATE: 82 BPM | RESPIRATION RATE: 16 BRPM | HEIGHT: 67 IN | TEMPERATURE: 97.7 F | OXYGEN SATURATION: 98 % | SYSTOLIC BLOOD PRESSURE: 124 MMHG | BODY MASS INDEX: 28.82 KG/M2 | DIASTOLIC BLOOD PRESSURE: 84 MMHG | WEIGHT: 183.6 LBS

## 2021-10-26 DIAGNOSIS — Q21.12 PFO (PATENT FORAMEN OVALE): ICD-10-CM

## 2021-10-26 DIAGNOSIS — R63.1 INCREASED THIRST: ICD-10-CM

## 2021-10-26 DIAGNOSIS — Z23 INFLUENZA VACCINE NEEDED: Primary | ICD-10-CM

## 2021-10-26 DIAGNOSIS — R53.83 TIREDNESS: ICD-10-CM

## 2021-10-26 DIAGNOSIS — N93.9 ABNORMAL UTERINE BLEEDING (AUB): ICD-10-CM

## 2021-10-26 DIAGNOSIS — G43.109 MIGRAINE WITH AURA AND WITHOUT STATUS MIGRAINOSUS, NOT INTRACTABLE: ICD-10-CM

## 2021-10-26 LAB
ALBUMIN SERPL-MCNC: 4.8 G/DL (ref 3.5–5.2)
ALBUMIN/GLOB SERPL: 1.7 G/DL
ALP SERPL-CCNC: 92 U/L (ref 39–117)
ALT SERPL W P-5'-P-CCNC: 26 U/L (ref 1–33)
ANION GAP SERPL CALCULATED.3IONS-SCNC: 9.6 MMOL/L (ref 5–15)
AST SERPL-CCNC: 17 U/L (ref 1–32)
BASOPHILS # BLD AUTO: 0.06 10*3/MM3 (ref 0–0.2)
BASOPHILS NFR BLD AUTO: 0.8 % (ref 0–1.5)
BILIRUB SERPL-MCNC: 0.4 MG/DL (ref 0–1.2)
BUN SERPL-MCNC: 12 MG/DL (ref 6–20)
BUN/CREAT SERPL: 11 (ref 7–25)
CALCIUM SPEC-SCNC: 9.2 MG/DL (ref 8.6–10.5)
CHLORIDE SERPL-SCNC: 103 MMOL/L (ref 98–107)
CO2 SERPL-SCNC: 24.4 MMOL/L (ref 22–29)
CREAT SERPL-MCNC: 1.09 MG/DL (ref 0.57–1)
DEPRECATED RDW RBC AUTO: 40.4 FL (ref 37–54)
EOSINOPHIL # BLD AUTO: 0.04 10*3/MM3 (ref 0–0.4)
EOSINOPHIL NFR BLD AUTO: 0.6 % (ref 0.3–6.2)
ERYTHROCYTE [DISTWIDTH] IN BLOOD BY AUTOMATED COUNT: 13.1 % (ref 12.3–15.4)
EXPIRATION DATE: NORMAL
GFR SERPL CREATININE-BSD FRML MDRD: 62 ML/MIN/1.73
GLOBULIN UR ELPH-MCNC: 2.8 GM/DL
GLUCOSE SERPL-MCNC: 77 MG/DL (ref 65–99)
HBA1C MFR BLD: 5 %
HCG SERPL QL: NEGATIVE
HCT VFR BLD AUTO: 40.3 % (ref 34–46.6)
HGB BLD-MCNC: 13.4 G/DL (ref 12–15.9)
IMM GRANULOCYTES # BLD AUTO: 0.03 10*3/MM3 (ref 0–0.05)
IMM GRANULOCYTES NFR BLD AUTO: 0.4 % (ref 0–0.5)
LYMPHOCYTES # BLD AUTO: 2.38 10*3/MM3 (ref 0.7–3.1)
LYMPHOCYTES NFR BLD AUTO: 33.2 % (ref 19.6–45.3)
Lab: NORMAL
MAGNESIUM SERPL-MCNC: 2.1 MG/DL (ref 1.6–2.6)
MCH RBC QN AUTO: 28.3 PG (ref 26.6–33)
MCHC RBC AUTO-ENTMCNC: 33.3 G/DL (ref 31.5–35.7)
MCV RBC AUTO: 85.2 FL (ref 79–97)
MONOCYTES # BLD AUTO: 0.42 10*3/MM3 (ref 0.1–0.9)
MONOCYTES NFR BLD AUTO: 5.9 % (ref 5–12)
NEUTROPHILS NFR BLD AUTO: 4.24 10*3/MM3 (ref 1.7–7)
NEUTROPHILS NFR BLD AUTO: 59.1 % (ref 42.7–76)
NRBC BLD AUTO-RTO: 0 /100 WBC (ref 0–0.2)
PLATELET # BLD AUTO: 383 10*3/MM3 (ref 140–450)
PMV BLD AUTO: 10.7 FL (ref 6–12)
POTASSIUM SERPL-SCNC: 4.1 MMOL/L (ref 3.5–5.2)
PROT SERPL-MCNC: 7.6 G/DL (ref 6–8.5)
RBC # BLD AUTO: 4.73 10*6/MM3 (ref 3.77–5.28)
SODIUM SERPL-SCNC: 137 MMOL/L (ref 136–145)
WBC # BLD AUTO: 7.17 10*3/MM3 (ref 3.4–10.8)

## 2021-10-26 PROCEDURE — 83036 HEMOGLOBIN GLYCOSYLATED A1C: CPT | Performed by: FAMILY MEDICINE

## 2021-10-26 PROCEDURE — 80053 COMPREHEN METABOLIC PANEL: CPT | Performed by: FAMILY MEDICINE

## 2021-10-26 PROCEDURE — 83735 ASSAY OF MAGNESIUM: CPT | Performed by: FAMILY MEDICINE

## 2021-10-26 PROCEDURE — 84703 CHORIONIC GONADOTROPIN ASSAY: CPT | Performed by: FAMILY MEDICINE

## 2021-10-26 PROCEDURE — 85025 COMPLETE CBC W/AUTO DIFF WBC: CPT | Performed by: FAMILY MEDICINE

## 2021-10-26 PROCEDURE — 82746 ASSAY OF FOLIC ACID SERUM: CPT | Performed by: FAMILY MEDICINE

## 2021-10-26 PROCEDURE — 82607 VITAMIN B-12: CPT | Performed by: FAMILY MEDICINE

## 2021-10-26 PROCEDURE — 90471 IMMUNIZATION ADMIN: CPT | Performed by: FAMILY MEDICINE

## 2021-10-26 PROCEDURE — 90686 IIV4 VACC NO PRSV 0.5 ML IM: CPT | Performed by: FAMILY MEDICINE

## 2021-10-26 PROCEDURE — 99214 OFFICE O/P EST MOD 30 MIN: CPT | Performed by: FAMILY MEDICINE

## 2021-10-26 PROCEDURE — 84443 ASSAY THYROID STIM HORMONE: CPT | Performed by: FAMILY MEDICINE

## 2021-10-26 NOTE — PROGRESS NOTES
Follow Up Office Visit      Patient Name: Yoly Cam  : 1995   MRN: [id number]     Chief Complaint:    Chief Complaint   Patient presents with   • Fatigue   • Dizziness   • Headache       History of Present Illness: Yoly Cam is a 25 y.o. female who is here today to follow up with multiple complaints.  Patient says over the last 1 to 2 months she has had almost daily migraine.  These migraines go away when she goes to sleep.  They are pulsating and usually on the right side.  Patient said there is severe but do respond to Excedrin.  She says they are 4 out of 10 after taking Excedrin.  She reports possibly trying Imitrex in the past.  Currently patient is on Prozac and Adderall.  Patient says she has extra stress in her life with waking up at night with her child and her  is currently deployed.  Patient says that she is also concerned that she may be pregnant as she does not had a period in several months.    Migraines worsening - has had almost daily migraine for two months. They last hours and go away at sleep. excedrin helps.  Patient has a history of migraines.  She has history of a PFO that may be causing migraines.  She has stress, poor sleep, and is currently not on medications for migraines.  She is using Excedrin as needed.    Dizziness-patient has dizziness after meals.  She says she will feel dizzy and feel nauseous.  Patient feels like possibly her blood sugar is dropping?    Tiredness-as described above patient has this chronic tiredness for the last 1 to 2 months.  This is daily and she gets poor sleep.  Patient wakes up at night and she also sleeps with her child in the bed.    Lack of period-patient has history of PCOS but she has not had a period since July.  Patient says she has had a at home pregnancy test be negative and she has had a serum test in our office that was negative as well.    Nausea, one episode of vomitting -patient has vomited  "once.  Patient says she gets a weird feeling in her stomach and feels sick after eating.  Patient says she feels hungry right after eating.      Review of systems was positive for nausea, tiredness, dizziness, headache      Physical exam: Patient's heart exam did not show any murmurs.  Patient's lung exam was clear.  Patient's affect was anxious.          Subjective        I have reviewed and the following portions of the patient's history were updated as appropriate: past family history, past medical history, past social history, past surgical history and problem list.    Medications:     Current Outpatient Medications:   •  amphetamine-dextroamphetamine (ADDERALL) 5 MG tablet, Take 1 tablet by mouth Daily., Disp: 30 tablet, Rfl: 0  •  FLUoxetine (PROzac) 20 MG capsule, Take 1 capsule by mouth daily with 40 mg capsule., Disp: 30 capsule, Rfl: 0  •  FLUoxetine (PROzac) 40 MG capsule, Take 1 capsule by mouth daily with 20 mg capsule., Disp: 30 capsule, Rfl: 0  •  hydrOXYzine pamoate (VISTARIL) 25 MG capsule, Take 1 capsule by mouth 3 (Three) Times a Day As Needed for Anxiety., Disp: 90 capsule, Rfl: 0  •  ubrogepant (ubrogepant) 100 MG tablet, Take 1 tablet by mouth 1 (One) Time As Needed (Migraine) for up to 1 dose., Disp: 8 tablet, Rfl: 2    Allergies:   Allergies   Allergen Reactions   • Ceclor [Cefaclor] Hives       Objective     Physical Exam: Please see HPI for physical exam  Vital Signs:   Vitals:    10/26/21 1033   BP: 124/84   Pulse: 82   Resp: 16   Temp: 97.7 °F (36.5 °C)   TempSrc: Temporal   SpO2: 98%   Weight: 83.3 kg (183 lb 9.6 oz)   Height: 170.2 cm (67\")   PainSc: 0-No pain     Body mass index is 28.76 kg/m².          Assessment / Plan      Assessment/Plan:   Diagnoses and all orders for this visit:    1. Influenza vaccine needed (Primary)  -     FluLaval/Fluarix/Fluzone >6 Months ([id number])    2. Abnormal uterine bleeding (AUB)  -     hCG, Serum, Qualitative    3. Tiredness  -     hCG, Serum, " Qualitative  -     CBC & Differential  -     Comprehensive Metabolic Panel  -     TSH Rfx On Abnormal To Free T4  -     Vitamin B12  -     Folate  -     Magnesium  -     POC Glycosylated Hemoglobin (Hb A1C)    4. PFO (patent foramen ovale)    5. Migraine with aura and without status migrainosus, not intractable  -     ubrogepant (ubrogepant) 100 MG tablet; Take 1 tablet by mouth 1 (One) Time As Needed (Migraine) for up to 1 dose.  Dispense: 8 tablet; Refill: 2    6. Increased thirst  -     POC Glycosylated Hemoglobin (Hb A1C)    Differential for patient's constellation of symptoms include pregnancy, migraine, lack of sleep, stress.  We will rule out organic disease with thyroid function, CBC, CMP, electrolyte and vitamin labs.  Will call patient with any concerning findings.  If these results are negative and patient worsens or has persistent symptoms then we may need to reevaluate PFO in the future with an echo.    Patient's migraines are severely uncontrolled.  She has a history of migraines and has not really tried medication for prevention or .  Today we discussed her sed rate may be worsening so I like for her to stop using it.  She could have a overuse headache.  We will start Ubrelvy as needed for migraines.  We will send in qulipta as well.  Patient can use this daily if approved            Follow Up:   Return if symptoms worsen or fail to improve.    Slim Fernandez,   [facility] [address]

## 2021-10-27 LAB
FOLATE SERPL-MCNC: 14.5 NG/ML (ref 4.78–24.2)
TSH SERPL DL<=0.05 MIU/L-ACNC: 0.97 UIU/ML (ref 0.27–4.2)
VIT B12 BLD-MCNC: 645 PG/ML (ref 211–946)

## 2021-10-28 DIAGNOSIS — G43.109 MIGRAINE WITH AURA AND WITHOUT STATUS MIGRAINOSUS, NOT INTRACTABLE: ICD-10-CM

## 2021-11-06 DIAGNOSIS — F33.42 MAJOR DEPRESSIVE DISORDER, RECURRENT, IN FULL REMISSION (HCC): Chronic | ICD-10-CM

## 2021-11-06 DIAGNOSIS — F41.1 GENERALIZED ANXIETY DISORDER: Chronic | ICD-10-CM

## 2021-11-06 DIAGNOSIS — F41.0 PANIC DISORDER (EPISODIC PAROXYSMAL ANXIETY): ICD-10-CM

## 2021-11-08 RX ORDER — FLUOXETINE HYDROCHLORIDE 40 MG/1
CAPSULE ORAL
Qty: 30 CAPSULE | Refills: 0 | Status: SHIPPED | OUTPATIENT
Start: 2021-11-08 | End: 2021-12-01

## 2021-11-08 RX ORDER — FLUOXETINE HYDROCHLORIDE 20 MG/1
CAPSULE ORAL
Qty: 30 CAPSULE | Refills: 0 | Status: SHIPPED | OUTPATIENT
Start: 2021-11-08 | End: 2021-11-24 | Stop reason: SDUPTHER

## 2021-11-24 DIAGNOSIS — F41.0 PANIC DISORDER (EPISODIC PAROXYSMAL ANXIETY): ICD-10-CM

## 2021-11-24 DIAGNOSIS — F90.2 ATTENTION DEFICIT HYPERACTIVITY DISORDER (ADHD), COMBINED TYPE: Chronic | ICD-10-CM

## 2021-11-24 DIAGNOSIS — F41.1 GENERALIZED ANXIETY DISORDER: Chronic | ICD-10-CM

## 2021-11-24 DIAGNOSIS — F33.42 MAJOR DEPRESSIVE DISORDER, RECURRENT, IN FULL REMISSION (HCC): Chronic | ICD-10-CM

## 2021-11-24 RX ORDER — FLUOXETINE HYDROCHLORIDE 40 MG/1
CAPSULE ORAL
Qty: 30 CAPSULE | Refills: 0 | OUTPATIENT
Start: 2021-11-24

## 2021-11-24 RX ORDER — DEXTROAMPHETAMINE SACCHARATE, AMPHETAMINE ASPARTATE, DEXTROAMPHETAMINE SULFATE AND AMPHETAMINE SULFATE 1.25; 1.25; 1.25; 1.25 MG/1; MG/1; MG/1; MG/1
5 TABLET ORAL DAILY
Qty: 30 TABLET | Refills: 0 | Status: SHIPPED | OUTPATIENT
Start: 2021-11-24 | End: 2022-01-04 | Stop reason: SDUPTHER

## 2021-11-24 RX ORDER — FLUOXETINE HYDROCHLORIDE 20 MG/1
60 CAPSULE ORAL DAILY
Qty: 90 CAPSULE | Refills: 0 | Status: SHIPPED | OUTPATIENT
Start: 2021-11-24 | End: 2021-12-01 | Stop reason: SDUPTHER

## 2021-12-01 DIAGNOSIS — F41.1 GENERALIZED ANXIETY DISORDER: Chronic | ICD-10-CM

## 2021-12-01 DIAGNOSIS — F33.42 MAJOR DEPRESSIVE DISORDER, RECURRENT, IN FULL REMISSION (HCC): Chronic | ICD-10-CM

## 2021-12-01 DIAGNOSIS — F41.0 PANIC DISORDER (EPISODIC PAROXYSMAL ANXIETY): ICD-10-CM

## 2021-12-01 RX ORDER — FLUOXETINE HYDROCHLORIDE 20 MG/1
60 CAPSULE ORAL DAILY
Qty: 90 CAPSULE | Refills: 0 | Status: SHIPPED | OUTPATIENT
Start: 2021-12-01 | End: 2022-01-26 | Stop reason: SDUPTHER

## 2021-12-01 RX ORDER — FLUOXETINE HYDROCHLORIDE 40 MG/1
CAPSULE ORAL
Qty: 30 CAPSULE | Refills: 0 | OUTPATIENT
Start: 2021-12-01

## 2021-12-01 NOTE — TELEPHONE ENCOUNTER
Patient missed her appointment due to visiting her mom.  Discussed no show policy with patient. Rescheduled patient for 1/7/2022.   Patient needs her Prozac refilled.  Please advise.

## 2022-01-04 DIAGNOSIS — F90.2 ATTENTION DEFICIT HYPERACTIVITY DISORDER (ADHD), COMBINED TYPE: Chronic | ICD-10-CM

## 2022-01-04 RX ORDER — DEXTROAMPHETAMINE SACCHARATE, AMPHETAMINE ASPARTATE, DEXTROAMPHETAMINE SULFATE AND AMPHETAMINE SULFATE 1.25; 1.25; 1.25; 1.25 MG/1; MG/1; MG/1; MG/1
5 TABLET ORAL DAILY
Qty: 30 TABLET | Refills: 0 | Status: SHIPPED | OUTPATIENT
Start: 2022-01-04 | End: 2022-01-11 | Stop reason: SDUPTHER

## 2022-01-04 NOTE — TELEPHONE ENCOUNTER
Patient was scheduled with Tammy Santos today (Provider out of office) Patient has rescheduled but needs refill please.    Thank You

## 2022-01-07 DIAGNOSIS — F90.2 ATTENTION DEFICIT HYPERACTIVITY DISORDER (ADHD), COMBINED TYPE: Chronic | ICD-10-CM

## 2022-01-10 RX ORDER — DEXTROAMPHETAMINE SACCHARATE, AMPHETAMINE ASPARTATE, DEXTROAMPHETAMINE SULFATE AND AMPHETAMINE SULFATE 1.25; 1.25; 1.25; 1.25 MG/1; MG/1; MG/1; MG/1
5 TABLET ORAL DAILY
Qty: 30 TABLET | Refills: 0 | OUTPATIENT
Start: 2022-01-10

## 2022-01-11 DIAGNOSIS — F90.2 ATTENTION DEFICIT HYPERACTIVITY DISORDER (ADHD), COMBINED TYPE: Chronic | ICD-10-CM

## 2022-01-11 RX ORDER — DEXTROAMPHETAMINE SACCHARATE, AMPHETAMINE ASPARTATE, DEXTROAMPHETAMINE SULFATE AND AMPHETAMINE SULFATE 1.25; 1.25; 1.25; 1.25 MG/1; MG/1; MG/1; MG/1
5 TABLET ORAL DAILY
Qty: 30 TABLET | Refills: 0 | Status: SHIPPED | OUTPATIENT
Start: 2022-01-11 | End: 2022-02-09 | Stop reason: SDUPTHER

## 2022-01-11 NOTE — TELEPHONE ENCOUNTER
Patient No Showed her appointment yesterday, her child has covid and was ill the appointment slipped her mind, she sends apologies. Patient has rescheduled however she needs a refill for Adderall please.    Thank You    I see that you sent this refill in on 1/4/22 however it went to [facility] in [place] and patient now lives in [place] (I cancelled order in [place])  Please refill at contact pharmacy in this note.

## 2022-01-26 ENCOUNTER — TELEMEDICINE (OUTPATIENT)
Dept: PSYCHIATRY | Facility: CLINIC | Age: 27
End: 2022-01-26

## 2022-01-26 DIAGNOSIS — F33.0 MILD EPISODE OF RECURRENT MAJOR DEPRESSIVE DISORDER: Chronic | ICD-10-CM

## 2022-01-26 DIAGNOSIS — F41.1 GENERALIZED ANXIETY DISORDER: Chronic | ICD-10-CM

## 2022-01-26 DIAGNOSIS — G47.9 SLEEPING DIFFICULTIES: ICD-10-CM

## 2022-01-26 DIAGNOSIS — F41.0 PANIC DISORDER (EPISODIC PAROXYSMAL ANXIETY): ICD-10-CM

## 2022-01-26 DIAGNOSIS — F90.2 ATTENTION DEFICIT HYPERACTIVITY DISORDER (ADHD), COMBINED TYPE: Primary | Chronic | ICD-10-CM

## 2022-01-26 PROCEDURE — 90833 PSYTX W PT W E/M 30 MIN: CPT | Performed by: NURSE PRACTITIONER

## 2022-01-26 PROCEDURE — 99214 OFFICE O/P EST MOD 30 MIN: CPT | Performed by: NURSE PRACTITIONER

## 2022-01-26 RX ORDER — BUSPIRONE HYDROCHLORIDE 5 MG/1
5 TABLET ORAL 2 TIMES DAILY
Qty: 30 TABLET | Refills: 0 | Status: SHIPPED | OUTPATIENT
Start: 2022-01-26 | End: 2022-02-09 | Stop reason: SDUPTHER

## 2022-01-26 RX ORDER — MEDROXYPROGESTERONE ACETATE 150 MG/ML
150 INJECTION, SUSPENSION INTRAMUSCULAR
COMMUNITY
End: 2022-11-16

## 2022-01-26 RX ORDER — FLUOXETINE HYDROCHLORIDE 20 MG/1
60 CAPSULE ORAL DAILY
Qty: 90 CAPSULE | Refills: 0 | Status: SHIPPED | OUTPATIENT
Start: 2022-01-26 | End: 2022-11-16
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
## 2022-02-09 ENCOUNTER — TELEMEDICINE (OUTPATIENT)
Dept: PSYCHIATRY | Facility: CLINIC | Age: 27
End: 2022-02-09

## 2022-02-09 DIAGNOSIS — F90.2 ATTENTION DEFICIT HYPERACTIVITY DISORDER (ADHD), COMBINED TYPE: Primary | Chronic | ICD-10-CM

## 2022-02-09 DIAGNOSIS — G47.9 SLEEPING DIFFICULTIES: ICD-10-CM

## 2022-02-09 DIAGNOSIS — F41.0 PANIC DISORDER (EPISODIC PAROXYSMAL ANXIETY): ICD-10-CM

## 2022-02-09 DIAGNOSIS — F33.0 MILD EPISODE OF RECURRENT MAJOR DEPRESSIVE DISORDER: Chronic | ICD-10-CM

## 2022-02-09 DIAGNOSIS — F41.1 GENERALIZED ANXIETY DISORDER: Chronic | ICD-10-CM

## 2022-02-09 PROCEDURE — 99214 OFFICE O/P EST MOD 30 MIN: CPT | Performed by: NURSE PRACTITIONER

## 2022-02-09 RX ORDER — DEXTROAMPHETAMINE SACCHARATE, AMPHETAMINE ASPARTATE, DEXTROAMPHETAMINE SULFATE AND AMPHETAMINE SULFATE 1.25; 1.25; 1.25; 1.25 MG/1; MG/1; MG/1; MG/1
5 TABLET ORAL DAILY
Qty: 30 TABLET | Refills: 0 | Status: SHIPPED | OUTPATIENT
Start: 2022-02-09 | End: 2022-11-16

## 2022-02-09 RX ORDER — BUSPIRONE HYDROCHLORIDE 10 MG/1
10 TABLET ORAL 2 TIMES DAILY
Qty: 30 TABLET | Refills: 0 | Status: SHIPPED | OUTPATIENT
Start: 2022-02-09 | End: 2022-11-16 | Stop reason: SDUPTHER

## 2022-02-09 RX ORDER — CLONIDINE HYDROCHLORIDE 0.1 MG/1
0.1 TABLET ORAL NIGHTLY
Qty: 15 TABLET | Refills: 0 | Status: SHIPPED | OUTPATIENT
Start: 2022-02-09 | End: 2022-11-16

## 2022-02-09 NOTE — PROGRESS NOTES
"This provider is located at the [facility] (through [facility]), [address], [place], [address] using a secure [employer] Video Visit through [employer]. Patient is being seen remotely via telehealth at their home address in [place], and stated they are in a secure environment for this session. The patient's condition being diagnosed/treated is appropriate for telemedicine. The provider identified herself as well as her credentials. The patient, and/or patients guardian, consent to be seen remotely, and when consent is given they understand that the consent allows for patient identifiable information to be sent to a third party as needed. They may refuse to be seen remotely at any time. The electronic data is encrypted and password protected, and the patient and/or guardian has been advised of the potential risks to privacy not withstanding such measures.    You have chosen to receive care through a telehealth visit.  Do you consent to use a video/audio connection for your medical care today? Yes     Subjective   Yoly Cam is a 26 y.o. female who is here today for medication management follow up.    Chief Complaint: Anxiety, depression, ADHD, sleeping difficulties     History of Present Illness:  The patient describes her mood as \"okay\" over the last few weeks.  The patient states that a lot is been going on in her life the past couple of weeks, so endorses that her anxiety has been increased.  The patient states that overall she feels that she is been tolerating things well and able to manage her anxiety despite everything that has been going on.  The patient states that she is concerned that she is feeling more irritable and agitated since beginning the BuSpar.  The patient states that it does not particularly happen at any specific time after taking medication, but endorses that overall she just felt herself being more easily frustrated and agitated since " "beginning the medication approximately 2 weeks ago.  Discussed the patient that based on her reported symptoms it appears that her irritability/agitation is more likely occurring due to increased anxiety rather than as a side effect of the BuSpar.  Discussed with patient that increasing the BuSpar obtaining better management of her anxiety should likely improve her irritability/agitation.  Discussed the patient that we will increase the dose of her BuSpar today in attempt to obtain better management of her anxiety.  Discussed with patient that if the agitation/irritability worsen with beginning increased dose of the BuSpar is litigate that the medication is causing this will discontinue at that time.  The patient verbalizes understanding and endorses that she is agreeable to this.  The patient reports her appetite as \"better\".  She states that she has been eating better and that appetite has been improved over the past couple of weeks.  The patient reports her sleep as fair.  The patient denies any difficulties falling asleep, but states that she has continued to having difficulty staying asleep.  States that she is still awakening 2-3 times per night.  The patient denies any nightmares or bad dreams.  The patient denies any new medical problems or changes in medications since last appointment with this facility.  The patient reports compliance with current medication regimen.  The patient denies any current side effects from her current medication regimen.  The patient denies any abnormal muscle movements or tics.  The patient rates her ADHD 6/10 on a 0-10 scale, with 10 being the worst.  The patient rates her depression at a 4/10 on a 0-10 scale, with 10 being the worst.  The patient rates her anxiety at a 8/10 on a 0-10 scale, with 10 being the worst.  The patient rates hopelessness at a 0/10 on a 0-10 scale with 10 being the worst.  The patient denies suicidal ideations and homicidal ideations, and is " convincing.  The patient denies any auditory hallucinations or visual hallucinations.  The patient does not endorse significant symptoms consistent with bipolar disorder or a psychotic illness.              LMP:  Patient states that she has not been having menstrual cycles due to beginning the Depo-Provera injection.  The patient denies any chance of pregnancy at this time.  The patient was educated that her prescribed medications can have potential risk to a developing fetus. The patient is advised to contact this APRN/this office if she becomes pregnant or plans to become pregnant.  Pt verbalizes understanding and acknowledged agreement with this plan in her own words.      Prior Psychiatric Medications:  Celexa - ineffective and endorses that it caused GI side effects  Lexapro - reports she was prescribed this medication for postpartum depression. States that initially it was effective for this but states that eventually it became ineffective.  Zoloft - ineffective  Effexor - ineffective      The following portions of the patient's history were reviewed and updated as appropriate: allergies, current medications, past family history, past medical history, past social history, past surgical history and problem list.    Review of Systems   Constitutional: Positive for appetite change and fatigue. Negative for activity change and unexpected weight change.   Psychiatric/Behavioral: Positive for decreased concentration, dysphoric mood and sleep disturbance. Negative for agitation, behavioral problems, confusion, hallucinations, self-injury and suicidal ideas. The patient is nervous/anxious. The patient is not hyperactive.        Objective   Physical Exam  Constitutional:       Appearance: Normal appearance.   Neurological:      Mental Status: She is alert.   Psychiatric:         Attention and Perception: She is inattentive.         Mood and Affect: Affect normal. Mood is anxious and depressed.         Speech: Speech  normal.         Behavior: Behavior normal. Behavior is cooperative.         Thought Content: Thought content normal. Thought content does not include homicidal or suicidal ideation. Thought content does not include homicidal or suicidal plan.         Cognition and Memory: Cognition and memory normal.         Judgment: Judgment normal.       not currently breastfeeding.    The patient was seen remotely today via a MyChart Video Visit through [facility].  Unable to obtain vital signs due to nature of remote visit.  Height stated at 67 inches.  Weight stated at 163 pounds.     Allergies   Allergen Reactions   • Ceclor [Cefaclor] Hives       No results found for this or any previous visit (from the past 2016 hour(s)).    Current Medications:   Current Outpatient Medications   Medication Sig Dispense Refill   • amphetamine-dextroamphetamine (ADDERALL) 5 MG tablet Take 1 tablet by mouth Daily. 30 tablet 0   • busPIRone (BUSPAR) 10 MG tablet Take 1 tablet by mouth 2 (Two) Times a Day. 30 tablet 0   • FLUoxetine (PROzac) 20 MG capsule Take 3 capsules by mouth Daily. 90 capsule 0   • hydrOXYzine pamoate (VISTARIL) 25 MG capsule Take 1 capsule by mouth 3 (Three) Times a Day As Needed for Anxiety. 90 capsule 0   • medroxyPROGESTERone (DEPO-PROVERA) 150 MG/ML injection Inject 150 mg into the appropriate muscle as directed by prescriber Every 3 (Three) Months.     • ubrogepant (ubrogepant) 100 MG tablet Take 1 tablet by mouth 1 (One) Time As Needed (Migraine) for up to 1 dose. 8 tablet 2   • cloNIDine (CATAPRES) 0.1 MG tablet Take 1 tablet by mouth Every Night. 15 tablet 0     No current facility-administered medications for this visit.       Mental Status Exam:   Hygiene:   good  Cooperation:  Cooperative  Eye Contact:  Good  Psychomotor Behavior:  Appropriate  Affect:  Full range  Hopelessness: Denies  Speech:  Normal  Thought Process:  Goal directed  Thought Content:  Normal  Suicidal:  None  Homicidal:  None  Hallucinations:   None  Delusion:  None  Memory:  Intact  Orientation:  Person, Place, Time and Situation  Reliability:  good  Insight:  Good  Judgement:  Good  Impulse Control:  Good  Physical/Medical Issues:  Yes See medical history      The NOELLE report, request number [id number], of the past 12 months were reviewed and is appropriate.  The patient/guardian reports taking the medication only as prescribed.  The patient/guardian denies any abuse or misuse of the medication.  The patient/guardian denies any other substance use or issues.  There are no apparent substance related issues.  The patient reports no side effects of the current medication usage.  The patient/guardian has reported significant improvement with medication usage and wishes to continue medication as prescribed.  The patient/guardian is appropriate to continue with current medication usage at this time.  Reinforced risks and side effects of medication usage, patient and/or guardian verbalize understanding in their own words and are in agreement with current plan.      Assessment/Plan   Diagnoses and all orders for this visit:    1. Attention deficit hyperactivity disorder (ADHD), combined type (Primary)  -     amphetamine-dextroamphetamine (ADDERALL) 5 MG tablet; Take 1 tablet by mouth Daily.  Dispense: 30 tablet; Refill: 0  -     cloNIDine (CATAPRES) 0.1 MG tablet; Take 1 tablet by mouth Every Night.  Dispense: 15 tablet; Refill: 0    2. Generalized anxiety disorder  -     busPIRone (BUSPAR) 10 MG tablet; Take 1 tablet by mouth 2 (Two) Times a Day.  Dispense: 30 tablet; Refill: 0    3. Mild episode of recurrent major depressive disorder (HCC)  -     busPIRone (BUSPAR) 10 MG tablet; Take 1 tablet by mouth 2 (Two) Times a Day.  Dispense: 30 tablet; Refill: 0    4. Panic disorder (episodic paroxysmal anxiety)  -     busPIRone (BUSPAR) 10 MG tablet; Take 1 tablet by mouth 2 (Two) Times a Day.  Dispense: 30 tablet; Refill: 0    5. Sleeping difficulties  -      cloNIDine (CATAPRES) 0.1 MG tablet; Take 1 tablet by mouth Every Night.  Dispense: 15 tablet; Refill: 0            Visit Diagnoses:    ICD-10-CM ICD-9-CM   1. Attention deficit hyperactivity disorder (ADHD), combined type  F90.2 314.01   2. Generalized anxiety disorder  F41.1 300.02   3. Mild episode of recurrent major depressive disorder (HCC)  F33.0 296.31   4. Panic disorder (episodic paroxysmal anxiety)  F41.0 300.01   5. Sleeping difficulties  G47.9 780.50       GOALS:  Short Term Goals: Patient will be compliant with medication, and patient will have no significant medication related side effects.  Patient will be engaged in psychotherapy as indicated.  Patient will report subjective improvement of symptoms.  Long term goals: To stabilize mood and treat/improve subjective symptoms, the patient will stay out of the hospital, the patient will be at an optimal level of functioning, and the patient will take all medications as prescribed.  The patient verbalized understanding and agreement with goals that were mutually set.      SUICIDE RISK ASSESSMENT: Unalterable demographics and a history of mental health intervention indicate this patient is in a high risk category compared to the general population. At present, the patient denies active SI/HI, intentions, or plans at this time and agrees to seek immediate care should such thoughts develop. The patient verbalizes understanding of how to access emergency care if needed and agrees to do so. Consideration of suicide risk and protective factors such as history, current presentation, individual strengths and weaknesses, psychosocial and environmental stressors and variables, psychiatric illness and symptoms, medical conditions and pain, took place in this interview. Based on those considerations, the patient is determined: within individual baseline and presenting no imminent risk for suicide or homicide. Other recommendations: The patient does not meet the  criteria for inpatient admission and is not a safety risk to self or others at today's visit. Inpatient treatment offers no significant advantages over outpatient treatment for this patient at today's visit.      SAFETY PLAN:  Patient was given ample time for questions and fully participated in treatment planning.  Patient was encouraged to call the clinic with any questions or concerns.  Patient was informed of access to emergency care. If patient were to develop any significant symptomatology, suicidal ideation, homicidal ideation, any concerns, or feel unsafe at any time they are to call the clinic and if unable to get immediate assistance should immediately call 911 or go to the nearest emergency room.  The patient is advised to remove or secure (lock away) all lethal weapons (including guns) and sharps (including razors, scissors, knives, etc.).  All medications (including any prescribed and any over the counter medications) should be stored in a safe and secured location that is not obtainable by children/adolescents.  Patient was given an opportunity and encouraged to ask questions about their medication, illness, and treatment. Patient contracted verbally for the following: If you are experiencing an emotional crisis or have thoughts of harming yourself or others, please go to your nearest local emergency room or call 911. Will continue to re-assess medication response and side effects frequently to establish efficacy and ensure safety. Risks, any black box warnings, side effects, off label usage, and benefits of medication and treatment discussed with patient, along with potential adverse side effects of current and/or newly prescribed medication, alternative treatment options, and OTC medications.  Patient verbalized understanding of potential risks, any off label use of medication, any black box warnings, and any side effects in their own words. The patient verbalized understanding and agreed to comply  with the safety plan discussed in their own words.  Patient given the number to the office. Number also available to the 24- hour suicide hotline.      TREATMENT PLAN/GOALS: Continue medications and treatment plan as indicated. Treatment and medication options discussed during today's visit. Patient acknowledged and verbally consented to continue with current treatment plan and was educated on the importance of compliance with treatment and follow-up appointments.        -Increase Buspar to 10 mg twice daily as adjunct for anxiety and depression.  Discussed with the patient that the increased irritability that she has been experiencing over the past couple weeks appears to be more consistent with increased anxiety rather than a medication side effect caused by the BuSpar.  Discussed the patient that increasing the BuSpar and obtaining further management of her anxiety would likely increased irritability that she has been experiencing.  Discussed with patient that if the irritability worsens with beginning an increased dose of the BuSpar to notify this APRN and the medication will be discontinued and other treatment options to address anxiety discussed at that time.  The patient verbalizes understanding and endorses that she is agreeable to this.  -Begin clonidine 0.1 mg nightly for sleeping difficulties and as adjunct for ADHD  -Continue Adderall 5 mg daily for ADHD  -Continue Prozac 60 mg daily for anxiety and depression   -Continue Hydroxyzine 25 mg three times daily as needed for anxiety.  Patient endorses that she does not need a refill for this medication at this time as she has not used it very frequently.  Will refill medication when needed.        MEDICATION ISSUES: Discussed medication options and treatment plan of prescribed medication, any off label use of medication, as well as the risks, benefits, any black box warnings including increased suicidality, and side effects including but not limited to  potential falls, dizziness, possible impaired driving, GI side effects (change in appetite, abdominal discomfort, nausea, vomiting, diarrhea, and/or constipation), dry mouth, somnolence, sedation, insomnia, activation, agitation, irritation, tremors, abnormal muscle movements or disorders, headache, sweating, possible bruising or rare bleeding, electrolyte and/or fluid abnormalities, change in blood pressure/heart rate/and or heart rhythm, sexual dysfunction, and metabolic adversities among others. Patient and/or guardian agreeable to call the office with any worsening of symptoms or onset of side effects, or if any concerns or questions arise.  The contact information for the office is made available to the patient and/or guardian.  Patient and/or guardian agreeable to call 911 or go to the nearest ER should they begin having any SI/HI, or if any urgent concerns arise. No medication side effects or related complaints today.    This APRN has discussed with the patient/guardian about the possibility of serotonin syndrome when certain medications are taken together, as is the case with this patient.  This APRN has provided the patient/guardian with a list of symptoms of serotonin syndrome including symptoms of autonomic instability, altered sensorium, confusion, restlessness, agitation, myoclonus, hyperreflexia, hyperthermia, diaphoresis, tremor, chills, diarrhea and cramps, ataxia, headache, migraines, seizures, and insomnia; which could lead to permanent hyperthermic brain damage, cardiovascular collapse, coma, or even death.  The patient/guardian are instructed to stop medications immediately and either contact this APRN/this office during regular office hours, or go to the emergency department/call 911, if they begin to experience any of the symptoms discussed.  The benefits and risks of the current medication regimen are discussed with the patient/guardian, and they feel that the benefits out weigh the risks.   The patient/guardian verbalized understanding and agreement in their own words.    The patient is being prescribed a controlled substance as part of the treatment plan. The patient/guardian has been educated of appropriate use of the medication(s), including risks and side effects such as insomnia, headache, exacerbation of tics, nervousness, irritability, overstimulation, tremor, dizziness, anorexia or change in appetite, nausea, dry mouth, constipation, diarrhea, weight loss, sexual dysfunction, psychotic episodes, seizures, palpitations, tachycardia, hypertension, rare activation (activation of hypomania, tamia, and/or suicidal ideations), cardiovascular adverse effects including sudden death (especially patients with pre-existing structural abnormalities often associated with a family history of cardiac disease), may slow normal growth in children, weight gain is reported but not expected, sedation is possible but activation is much more common, metabolic adversities, and overdose among others. Patient/guardian is also informed that the medication is to be used by the patient only, the medication is to be used only as directed, and the medication should not be combined with other substances unless directed by a Provider/Prescriber. The patient/guardian verbalized understanding and agreement with this in their own words, and wish to continue with current treatment plan.    Without the prescribed medication for ADHD, it is reported that the patient has problems with attention and focus including being easily distracted, easily losing objects, trouble with time management, trouble completing tasks because of distractions, procrastination, indecisiveness, careless mistakes in daily activities/work/school, and not finishing jobs that are started.  Patient denies any side effects, no worsening of insomnia, and no worsening of anxiety on the medication dose.  Due to the reported problems without the medication usage,  as well as the significant improvement in symptoms with the medication usage, the patient requests to remain on the prescribed medication for ADHD.                    [facility] No Show Policy:  We understand unexpected circumstances arise; however, anytime you miss your appointment we are unable to provide you appropriate care.  In addition, each appointment missed could have been used to provide care for others.  We ask that you call at least 24 hours in advance to cancel or reschedule an appointment.  We would like to take this opportunity to remind you of our policy stating patients who miss THREE or more appointments without cancelling or rescheduling 24 hours in advance of the appointment may be subject to cancellation of any further visits with our clinic and recommendation to seek in-person services/visits.    Please call [phone] to reschedule your appointment. If there are reasons that make it difficult for you to keep the appointments, please call and let us know how we can help.  Please understand that medication prescribing will not continue without seeing your provider.      [facility]'s No Show Policy reviewed with patient at today's visit. Patient verbalized understanding of this policy. Discussed with patient that in the event that there are three or more no show visits, it will be recommended that they pursue in-person services/visits as noncompliance with telehealth visits indicates that patient is not an appropriate candidate for telemedicine and would likely be more appropriate for in-person services/visits. Patient verbalizes understanding and is agreeable to this.            MEDS ORDERED DURING VISIT:  New Medications Ordered This Visit   Medications   • amphetamine-dextroamphetamine (ADDERALL) 5 MG tablet     Sig: Take 1 tablet by mouth Daily.     Dispense:  30 tablet     Refill:  0   • busPIRone (BUSPAR) 10 MG tablet     Sig: Take 1 tablet  by mouth 2 (Two) Times a Day.     Dispense:  30 tablet     Refill:  0   • cloNIDine (CATAPRES) 0.1 MG tablet     Sig: Take 1 tablet by mouth Every Night.     Dispense:  15 tablet     Refill:  0       Return in about 2 weeks (around 2/23/2022), or if symptoms worsen or fail to improve, for Recheck.        Patient will follow-up in 2 weeks, highly encouraged the patient if she had any questions or concerns to contact the behavioral health [facility] clinic for sooner appointment patient verbalized understanding.      Functional Status: Moderate impairment     Prognosis: Guarded with Ongoing Treatment            This document has been electronically signed by MILA Mcallister  February 9, 2022 16:07 EST       Some of the data in this electronic note has been brought forward from a previous encounter, any necessary changes have been made, it has been reviewed by this APRN, and it is accurate.      Part of this note may be an electronic transcription/translation of spoken language to printed text using the Dragon Dictation System.

## 2022-11-16 ENCOUNTER — TELEMEDICINE (OUTPATIENT)
Dept: PSYCHIATRY | Facility: CLINIC | Age: 27
End: 2022-11-16

## 2022-11-16 DIAGNOSIS — F41.0 PANIC DISORDER (EPISODIC PAROXYSMAL ANXIETY): Chronic | ICD-10-CM

## 2022-11-16 DIAGNOSIS — F33.42 RECURRENT MAJOR DEPRESSIVE DISORDER, IN FULL REMISSION: Chronic | ICD-10-CM

## 2022-11-16 DIAGNOSIS — F41.1 GENERALIZED ANXIETY DISORDER: Chronic | ICD-10-CM

## 2022-11-16 DIAGNOSIS — F90.8 ADHD, ADULT RESIDUAL TYPE: Primary | Chronic | ICD-10-CM

## 2022-11-16 PROCEDURE — 90792 PSYCH DIAG EVAL W/MED SRVCS: CPT | Performed by: NURSE PRACTITIONER

## 2022-11-16 RX ORDER — FLUOXETINE HYDROCHLORIDE 40 MG/1
80 CAPSULE ORAL DAILY
Qty: 60 CAPSULE | Refills: 0 | Status: SHIPPED | OUTPATIENT
Start: 2022-11-16 | End: 2022-12-15 | Stop reason: SDUPTHER

## 2022-11-16 RX ORDER — FLUOXETINE HYDROCHLORIDE 40 MG/1
80 CAPSULE ORAL DAILY
COMMUNITY
End: 2022-11-16 | Stop reason: SDUPTHER

## 2022-11-16 RX ORDER — DEXTROAMPHETAMINE SACCHARATE, AMPHETAMINE ASPARTATE, DEXTROAMPHETAMINE SULFATE AND AMPHETAMINE SULFATE 5; 5; 5; 5 MG/1; MG/1; MG/1; MG/1
10 TABLET ORAL 2 TIMES DAILY
COMMUNITY
End: 2022-12-15

## 2022-11-16 RX ORDER — BUSPIRONE HYDROCHLORIDE 10 MG/1
10 TABLET ORAL 2 TIMES DAILY
Qty: 60 TABLET | Refills: 0 | Status: SHIPPED | OUTPATIENT
Start: 2022-11-16 | End: 2022-12-15 | Stop reason: SDUPTHER

## 2022-11-16 NOTE — PROGRESS NOTES
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 if they begin to experience any of the symptoms discussed.  The benefits and risks of the current medication regimen are discussed with the patient/guardian, and they feel that the benefits out weigh the risks.  The patient/guardian verbalized understanding and agreement in their own words.    The patient will be prescribed a controlled substance as part of the treatment plan once results of recent UDS and EKG have been received and reviewed by this APRN (if both appropriate). The patient/guardian has been educated of appropriate use of the medication(s), including risks and side effects such as insomnia, headache, exacerbation of tics, nervousness, irritability, overstimulation, tremor, dizziness, anorexia or change in appetite, nausea, dry mouth, constipation, diarrhea, weight loss, sexual dysfunction, psychotic episodes, seizures, palpitations, tachycardia, hypertension, rare activation (activation of hypomania, tamia, and/or suicidal ideations), cardiovascular adverse effects including sudden death (especially patients with pre-existing structural abnormalities often associated with a family history of cardiac disease), may slow normal growth in children, weight gain is reported but not  expected, sedation is possible but activation is much more common, metabolic adversities, and overdose among others. Patient/guardian is also informed that the medication is to be used by the patient only, the medication is to be used only as directed, and the medication should not be combined with other substances unless directed by a Provider/Prescriber. The patient/guardian verbalized understanding and agreement with this in their own words, and wish to continue with current treatment plan.              [facility] No Show Policy:  We understand unexpected circumstances arise; however, anytime you miss your appointment we are unable to provide you appropriate care.  In addition, each appointment missed could have been used to provide care for others.  We ask that you call at least 24 hours in advance to cancel or reschedule an appointment.  We would like to take this opportunity to remind you of our policy stating patients who miss THREE or more appointments without cancelling or rescheduling 24 hours in advance of the appointment may be subject to cancellation of any further visits with our clinic and recommendation to seek in-person services/visits.    Please call [phone] to reschedule your appointment. If there are reasons that make it difficult for you to keep the appointments, please call and let us know how we can help.  Please understand that medication prescribing will not continue without seeing your provider.      [facility]'s No Show Policy reviewed with patient at today's visit. Patient verbalized understanding of this policy. Discussed with patient that in the event that there are three or more no show visits, it will be recommended that they pursue in-person services/visits as noncompliance with telehealth visits indicates that patient is not an appropriate candidate for telemedicine and would likely be more appropriate for in-person  services/visits. Patient verbalizes understanding and is agreeable to this.        MEDS ORDERED DURING VISIT:  New Medications Ordered This Visit   Medications   • FLUoxetine (PROzac) 40 MG capsule     Sig: Take 2 capsules by mouth Daily.     Dispense:  60 capsule     Refill:  0   • busPIRone (BUSPAR) 10 MG tablet     Sig: Take 1 tablet by mouth 2 (Two) Times a Day.     Dispense:  60 tablet     Refill:  0       Return in about 4 weeks (around 12/14/2022), or if symptoms worsen or fail to improve, for Recheck.        Patient will follow-up in 4 weeks, highly encouraged the patient if she had any questions or concerns to contact the behavioral health [facility] clinic for sooner appointment patient verbalized understanding.      Functional Status: Moderate impairment     Prognosis: Fair with Ongoing Treatment              This document has been electronically signed by MILA Mcallister  November 16, 2022 11:00 EST    Part of this note may be an electronic transcription/translation of spoken language to printed text using the Dragon Dictation System.

## 2022-12-15 ENCOUNTER — TELEMEDICINE (OUTPATIENT)
Dept: PSYCHIATRY | Facility: CLINIC | Age: 27
End: 2022-12-15

## 2022-12-15 DIAGNOSIS — F90.8 ADHD, ADULT RESIDUAL TYPE: Primary | Chronic | ICD-10-CM

## 2022-12-15 DIAGNOSIS — F41.0 PANIC DISORDER (EPISODIC PAROXYSMAL ANXIETY): Chronic | ICD-10-CM

## 2022-12-15 DIAGNOSIS — F33.0 MILD EPISODE OF RECURRENT MAJOR DEPRESSIVE DISORDER: Chronic | ICD-10-CM

## 2022-12-15 DIAGNOSIS — F41.1 GENERALIZED ANXIETY DISORDER: Chronic | ICD-10-CM

## 2022-12-15 PROCEDURE — 99214 OFFICE O/P EST MOD 30 MIN: CPT | Performed by: NURSE PRACTITIONER

## 2022-12-15 RX ORDER — FLUOXETINE HYDROCHLORIDE 40 MG/1
80 CAPSULE ORAL DAILY
Qty: 60 CAPSULE | Refills: 0 | Status: SHIPPED | OUTPATIENT
Start: 2022-12-15

## 2022-12-15 RX ORDER — BUSPIRONE HYDROCHLORIDE 10 MG/1
10 TABLET ORAL 2 TIMES DAILY
Qty: 60 TABLET | Refills: 0 | Status: SHIPPED | OUTPATIENT
Start: 2022-12-15

## 2022-12-15 RX ORDER — DEXTROAMPHETAMINE SACCHARATE, AMPHETAMINE ASPARTATE MONOHYDRATE, DEXTROAMPHETAMINE SULFATE AND AMPHETAMINE SULFATE 5; 5; 5; 5 MG/1; MG/1; MG/1; MG/1
20 CAPSULE, EXTENDED RELEASE ORAL EVERY MORNING
Qty: 30 CAPSULE | Refills: 0 | Status: SHIPPED | OUTPATIENT
Start: 2022-12-15

## 2022-12-15 NOTE — PROGRESS NOTES
"This provider is located at the [facility] (through [facility]), [address], [place], [address] using a secure [employer] Video Visit through [employer]. Patient is being seen remotely via telehealth at their home address in [place], and stated they are in a secure environment for this session. The patient's condition being diagnosed/treated is appropriate for telemedicine. The provider identified herself as well as her credentials. The patient, and/or patients guardian, consent to be seen remotely, and when consent is given they understand that the consent allows for patient identifiable information to be sent to a third party as needed. They may refuse to be seen remotely at any time. The electronic data is encrypted and password protected, and the patient and/or guardian has been advised of the potential risks to privacy not withstanding such measures.    You have chosen to receive care through a telehealth visit.  Do you consent to use a video/audio connection for your medical care today? Yes     Patient confirmed consent for today's encounter on 12/15/22.          Subjective   Yoly Cam is a 27 y.o. female who is here today for medication management follow up.    Chief Complaint: Anxiety, depression, ADHD    History of Present Illness:  The patient describes her mood as \"good\" over the last few weeks.  The patient endorses that overall she has been doing okay over the past few weeks, but states that ADHD symptoms have continued to be poorly controlled since she has not been taking the Adderall.  She endorses that she feels that poor management of ADHD symptoms are actually contributing to some mild worsening of her depression and anxiety recently.  She endorses that she feels that depression and anxiety will improve once her ADHD symptoms are more well controlled with resuming treatment with Adderall.  The patient reports her appetite as good.  The " patient reports her sleep as good.  The patient denies any nightmares or bad dreams.  The patient reports that she had a surgical procedure last week to repair a hernia, but otherwise denies any new medical problems or changes in medications since last appointment with this facility.  The patient reports compliance with current medication regimen.  The patient denies any current side effects from her current medication regimen.  The patient denies any abnormal muscle movements or tics.  The patient rates her ADHD at a 8/10 on a 0-10 scale, with 10 being the worst.  The patient rates her depression at a 3/10 on a 0-10 scale, with 10 being the worst.  The patient rates her anxiety at a 3/10 on a 0-10 scale, with 10 being the worst.  The patient rates hopelessness at a 0/10 on a 0-10 scale with 10 being the worst.  The patient denies suicidal ideations and homicidal ideations, and is convincing.  The patient denies any auditory hallucinations or visual hallucinations.  The patient does not endorse significant symptoms consistent with bipolar disorder or a psychotic illness.              LMP:  Reports that she has not had regular menstrual cycles since stopping birth control a few months ago, but states that she is not currently sexually active or trying to conceive.  The patient denies any chance of pregnancy at this time.  The patient was educated that her prescribed medications can have potential risk to a developing fetus. The patient is advised to contact this APRN/this office if she becomes pregnant or plans to become pregnant.  Pt verbalizes understanding and acknowledged agreement with this plan in her own words.      Prior Psychiatric Medications:  Adderall XR 10 mg daily - reports partial efficacy with this medication, but then she was switched to Adderall IR and the dosage was increased to 10 mg twice daily and this was more effective for her.    Adderall 10 mg twice daily - effective, but had difficulty  remembering twice daily dosing schedule   Celexa - ineffective and endorses that it caused GI side effects  Lexapro - reports she was prescribed this medication for postpartum depression. States that initially it was effective for this but states that eventually it became ineffective.  Zoloft - ineffective  Effexor - ineffective    The following portions of the patient's history were reviewed and updated as appropriate: allergies, current medications, past family history, past medical history, past social history, past surgical history and problem list.    Review of Systems   Constitutional: Negative for activity change, appetite change, fatigue and unexpected weight change.   Psychiatric/Behavioral: Positive for decreased concentration and dysphoric mood. Negative for agitation, behavioral problems, confusion, hallucinations, self-injury, sleep disturbance and suicidal ideas. The patient is nervous/anxious. The patient is not hyperactive.        Objective   Physical Exam  Constitutional:       Appearance: Normal appearance.   Neurological:      Mental Status: She is alert.   Psychiatric:         Attention and Perception: Perception normal. She is inattentive.         Mood and Affect: Affect normal. Mood is anxious and depressed.         Speech: Speech normal.         Behavior: Behavior normal. Behavior is cooperative.         Thought Content: Thought content normal. Thought content does not include homicidal or suicidal ideation. Thought content does not include homicidal or suicidal plan.         Cognition and Memory: Cognition and memory normal.         Judgment: Judgment normal.       not currently breastfeeding.    The patient was seen remotely today via a MyChart Video Visit through [facility].  Unable to obtain vital signs due to nature of remote visit.  Height stated at 67 inches.  Weight stated at 160 pounds.     Allergies   Allergen Reactions   • Ceclor [Cefaclor] Hives       No results found for this or any  previous visit (from the past 2016 hour(s)).    Current Medications:   Current Outpatient Medications   Medication Sig Dispense Refill   • busPIRone (BUSPAR) 10 MG tablet Take 1 tablet by mouth 2 (Two) Times a Day. 60 tablet 0   • FLUoxetine (PROzac) 40 MG capsule Take 2 capsules by mouth Daily. 60 capsule 0   • amphetamine-dextroamphetamine XR (ADDERALL XR) 20 MG 24 hr capsule Take 1 capsule by mouth Every Morning 30 capsule 0     No current facility-administered medications for this visit.       Mental Status Exam:   Hygiene:   good  Cooperation:  Cooperative  Eye Contact:  Good  Psychomotor Behavior:  Appropriate  Affect:  Full range  Hopelessness: Denies  Speech:  Normal  Thought Process:  Goal directed and Linear  Thought Content:  Normal  Suicidal:  None  Homicidal:  None  Hallucinations:  None  Delusion:  None  Memory:  Intact  Orientation:  Person, Place, Time and Situation  Reliability:  good  Insight:  Good  Judgement:  Good  Impulse Control:  Good  Physical/Medical Issues:  Yes See medical history          Medical records from previous provider reviewed by this APRN at today's encounter, including UDS and 12-lead ECG.  -UDS completed 5/22/22 was appropriate.  Positive for amphetamines, which was an expected finding as she was being treated for ADHD with Adderall at that time.   -12-lead ECG completed 5/20/22 and confirmed by consulted cardiologist at [facility] was reviewed by this APRN at today's encounter.  No abnormalities noted to rule out patient's candidacy for continuation of stimulant medication for treatment of ADHD at this time.  Discussed with patient that we plan to periodically repeat ECG testing in the future for continued monitoring of patient's candidacy for continued stimulant use, and will repeat ECG testing sooner if there are any changes in the patient's medical history or as needed/any concerns arise.  The patient verbalizes understanding and agreement in their own  words.          Assessment & Plan   Diagnoses and all orders for this visit:    1. ADHD, adult residual type (Primary)  -     amphetamine-dextroamphetamine XR (ADDERALL XR) 20 MG 24 hr capsule; Take 1 capsule by mouth Every Morning  Dispense: 30 capsule; Refill: 0    2. Mild episode of recurrent major depressive disorder (HCC)  -     FLUoxetine (PROzac) 40 MG capsule; Take 2 capsules by mouth Daily.  Dispense: 60 capsule; Refill: 0  -     busPIRone (BUSPAR) 10 MG tablet; Take 1 tablet by mouth 2 (Two) Times a Day.  Dispense: 60 tablet; Refill: 0    3. Generalized anxiety disorder  -     FLUoxetine (PROzac) 40 MG capsule; Take 2 capsules by mouth Daily.  Dispense: 60 capsule; Refill: 0  -     busPIRone (BUSPAR) 10 MG tablet; Take 1 tablet by mouth 2 (Two) Times a Day.  Dispense: 60 tablet; Refill: 0    4. Panic disorder (episodic paroxysmal anxiety)  -     FLUoxetine (PROzac) 40 MG capsule; Take 2 capsules by mouth Daily.  Dispense: 60 capsule; Refill: 0  -     busPIRone (BUSPAR) 10 MG tablet; Take 1 tablet by mouth 2 (Two) Times a Day.  Dispense: 60 tablet; Refill: 0            Visit Diagnoses:    ICD-10-CM ICD-9-CM   1. ADHD, adult residual type  F90.8 314.01   2. Mild episode of recurrent major depressive disorder (HCC)  F33.0 296.31   3. Generalized anxiety disorder  F41.1 300.02   4. Panic disorder (episodic paroxysmal anxiety)  F41.0 300.01       GOALS:  Short Term Goals: Patient will be compliant with medication, and patient will have no significant medication related side effects.  Patient will be engaged in psychotherapy as indicated.  Patient will report subjective improvement of symptoms.  Long term goals: To stabilize mood and treat/improve subjective symptoms, the patient will stay out of the hospital, the patient will be at an optimal level of functioning, and the patient will take all medications as prescribed.  The patient verbalized understanding and agreement with goals that were mutually  set.      SUICIDE RISK ASSESSMENT: Unalterable demographics and a history of mental health intervention indicate this patient is in a high risk category compared to the general population. At present, the patient denies active SI/HI, intentions, or plans at this time and agrees to seek immediate care should such thoughts develop. The patient verbalizes understanding of how to access emergency care if needed and agrees to do so. Consideration of suicide risk and protective factors such as history, current presentation, individual strengths and weaknesses, psychosocial and environmental stressors and variables, psychiatric illness and symptoms, medical conditions and pain, took place in this interview. Based on those considerations, the patient is determined: within individual baseline and presenting no imminent risk for suicide or homicide. Other recommendations: The patient does not meet the criteria for inpatient admission and is not a safety risk to self or others at today's visit. Inpatient treatment offers no significant advantages over outpatient treatment for this patient at today's visit.      SAFETY PLAN:  Patient was given ample time for questions and fully participated in treatment planning.  Patient was encouraged to call the clinic with any questions or concerns.  Patient was informed of access to emergency care. If patient were to develop any significant symptomatology, suicidal ideation, homicidal ideation, any concerns, or feel unsafe at any time they are to call the clinic and if unable to get immediate assistance should immediately call 911 or go to the nearest emergency room.  The patient is advised to remove or secure (lock away) all lethal weapons (including guns) and sharps (including razors, scissors, knives, etc.).  All medications (including any prescribed and any over the counter medications) should be stored in a safe and secured location that is not obtainable by children/adolescents.   Patient was given an opportunity and encouraged to ask questions about their medication, illness, and treatment. Patient contracted verbally for the following: If you are experiencing an emotional crisis or have thoughts of harming yourself or others, please go to your nearest local emergency room or call 911. Will continue to re-assess medication response and side effects frequently to establish efficacy and ensure safety. Risks, any black box warnings, side effects, off label usage, and benefits of medication and treatment discussed with patient, along with potential adverse side effects of current and/or newly prescribed medication, alternative treatment options, and OTC medications.  Patient verbalized understanding of potential risks, any off label use of medication, any black box warnings, and any side effects in their own words. The patient verbalized understanding and agreed to comply with the safety plan discussed in their own words.  Patient given the number to the office. Number also available to the 24- hour suicide hotline.       TREATMENT PLAN/GOALS: Continue medications and treatment plan as indicated. Treatment and medication options discussed during today's visit. Patient acknowledged and verbally consented to continue with current treatment plan and was educated on the importance of compliance with treatment and follow-up appointments.        -Begin Adderall XR 20 mg daily for ADHD  -Continue Prozac 80 mg daily for depression/anxiety  -Continue Buspar 10 mg twice daily as adjunct for depression/anxiety        MEDICATION ISSUES: Discussed medication options and treatment plan of prescribed medication, any off label use of medication, as well as the risks, benefits, any black box warnings including increased suicidality, and side effects including but not limited to potential falls, dizziness, possible impaired driving, GI side effects (change in appetite, abdominal discomfort, nausea, vomiting,  diarrhea, and/or constipation), dry mouth, somnolence, sedation, insomnia, activation, agitation, irritation, tremors, abnormal muscle movements or disorders, headache, sweating, possible bruising or rare bleeding, electrolyte and/or fluid abnormalities, change in blood pressure/heart rate/and or heart rhythm, sexual dysfunction, and metabolic adversities among others. Patient and/or guardian agreeable to call the office with any worsening of symptoms or onset of side effects, or if any concerns or questions arise.  The contact information for the office is made available to the patient and/or guardian.  Patient and/or guardian agreeable to call 911 or go to the nearest ER should they begin having any SI/HI, or if any urgent concerns arise. No medication side effects or related complaints today.    This APRN has discussed with the patient/guardian about the possibility of serotonin syndrome when certain medications are taken together, as is the case with this patient.  This APRN has provided the patient/guardian with a list of symptoms of serotonin syndrome including symptoms of autonomic instability, altered sensorium, confusion, restlessness, agitation, myoclonus, hyperreflexia, hyperthermia, diaphoresis, tremor, chills, diarrhea and cramps, ataxia, headache, migraines, seizures, and insomnia; which could lead to permanent hyperthermic brain damage, cardiovascular collapse, coma, or even death.  The patient/guardian are instructed to stop medications immediately and either contact this APRN/this office during regular office hours, or go to the emergency department/call 911, if they begin to experience any of the symptoms discussed.  The benefits and risks of the current medication regimen are discussed with the patient/guardian, and they feel that the benefits out weigh the risks.  The patient/guardian verbalized understanding and agreement in their own words.    The patient is being prescribed a controlled  substance as part of the treatment plan. The patient/guardian has been educated of appropriate use of the medication(s), including risks and side effects such as insomnia, headache, exacerbation of tics, nervousness, irritability, overstimulation, tremor, dizziness, anorexia or change in appetite, nausea, dry mouth, constipation, diarrhea, weight loss, sexual dysfunction, psychotic episodes, seizures, palpitations, tachycardia, hypertension, rare activation (activation of hypomania, tamia, and/or suicidal ideations), cardiovascular adverse effects including sudden death (especially patients with pre-existing structural abnormalities often associated with a family history of cardiac disease), may slow normal growth in children, weight gain is reported but not expected, sedation is possible but activation is much more common, metabolic adversities, and overdose among others. Patient/guardian is also informed that the medication is to be used by the patient only, the medication is to be used only as directed, and the medication should not be combined with other substances unless directed by a Provider/Prescriber. The patient/guardian verbalized understanding and agreement with this in their own words, and wish to continue with current treatment plan.                  [facility] No Show Policy:  We understand unexpected circumstances arise; however, anytime you miss your appointment we are unable to provide you appropriate care.  In addition, each appointment missed could have been used to provide care for others.  We ask that you call at least 24 hours in advance to cancel or reschedule an appointment.  We would like to take this opportunity to remind you of our policy stating patients who miss THREE or more appointments without cancelling or rescheduling 24 hours in advance of the appointment may be subject to cancellation of any further visits with our clinic and recommendation to seek  in-person services/visits.    Please call [phone] to reschedule your appointment. If there are reasons that make it difficult for you to keep the appointments, please call and let us know how we can help.  Please understand that medication prescribing will not continue without seeing your provider.      [facility]'s No Show Policy reviewed with patient at today's visit. Patient verbalized understanding of this policy. Discussed with patient that in the event that there are three or more no show visits, it will be recommended that they pursue in-person services/visits as noncompliance with telehealth visits indicates that patient is not an appropriate candidate for telemedicine and would likely be more appropriate for in-person services/visits. Patient verbalizes understanding and is agreeable to this.           MEDS ORDERED DURING VISIT:  New Medications Ordered This Visit   Medications   • amphetamine-dextroamphetamine XR (ADDERALL XR) 20 MG 24 hr capsule     Sig: Take 1 capsule by mouth Every Morning     Dispense:  30 capsule     Refill:  0   • FLUoxetine (PROzac) 40 MG capsule     Sig: Take 2 capsules by mouth Daily.     Dispense:  60 capsule     Refill:  0   • busPIRone (BUSPAR) 10 MG tablet     Sig: Take 1 tablet by mouth 2 (Two) Times a Day.     Dispense:  60 tablet     Refill:  0       Return in about 4 weeks (around 1/12/2023), or if symptoms worsen or fail to improve, for Recheck.        Patient will follow-up in 4 weeks, highly encouraged the patient if she had any questions or concerns to contact the behavioral health virtual clinic for sooner appointment patient verbalized understanding.      Functional Status: Moderate impairment     Prognosis: Fair with Ongoing Treatment             This document has been electronically signed by MILA Mcallister  December 15, 2022 10:35 EST      Some of the data in this electronic note has been brought forward from a previous  encounter, any necessary changes have been made, it has been reviewed by this APRN, and it is accurate.       Part of this note may be an electronic transcription/translation of spoken language to printed text using the Dragon Dictation System.

## 2022-12-26 DIAGNOSIS — F41.1 GENERALIZED ANXIETY DISORDER: Chronic | ICD-10-CM

## 2022-12-26 DIAGNOSIS — F41.0 PANIC DISORDER (EPISODIC PAROXYSMAL ANXIETY): Chronic | ICD-10-CM

## 2022-12-26 DIAGNOSIS — F33.0 MILD EPISODE OF RECURRENT MAJOR DEPRESSIVE DISORDER: Chronic | ICD-10-CM

## 2022-12-27 RX ORDER — FLUOXETINE HYDROCHLORIDE 40 MG/1
80 CAPSULE ORAL DAILY
Qty: 60 CAPSULE | Refills: 0 | OUTPATIENT
Start: 2022-12-27

## 2023-01-05 ENCOUNTER — TELEPHONE (OUTPATIENT)
Dept: PSYCHIATRY | Facility: CLINIC | Age: 28
End: 2023-01-05
Payer: COMMERCIAL

## 2023-01-05 NOTE — TELEPHONE ENCOUNTER
Patient called stating she can not find Adderall anywhere in her small town of [place] due to a nation wide shortage and would like to know if provider can prescribe another medication.  Please advise.